# Patient Record
Sex: MALE | Race: WHITE | NOT HISPANIC OR LATINO | Employment: OTHER | URBAN - NONMETROPOLITAN AREA
[De-identification: names, ages, dates, MRNs, and addresses within clinical notes are randomized per-mention and may not be internally consistent; named-entity substitution may affect disease eponyms.]

---

## 2021-09-01 ENCOUNTER — OFFICE VISIT (OUTPATIENT)
Dept: FAMILY MEDICINE CLINIC | Age: 31
End: 2021-09-01

## 2021-09-01 VITALS
WEIGHT: 125.3 LBS | HEIGHT: 61 IN | SYSTOLIC BLOOD PRESSURE: 115 MMHG | DIASTOLIC BLOOD PRESSURE: 59 MMHG | BODY MASS INDEX: 23.66 KG/M2 | TEMPERATURE: 97.7 F | HEART RATE: 98 BPM

## 2021-09-01 DIAGNOSIS — J30.2 SEASONAL ALLERGIES: ICD-10-CM

## 2021-09-01 DIAGNOSIS — T78.40XD ALLERGY, SUBSEQUENT ENCOUNTER: Primary | ICD-10-CM

## 2021-09-01 PROCEDURE — 99203 OFFICE O/P NEW LOW 30 MIN: CPT | Performed by: NURSE PRACTITIONER

## 2021-09-01 RX ORDER — CETIRIZINE HYDROCHLORIDE 10 MG/1
10 TABLET ORAL DAILY
Qty: 30 TABLET | Refills: 11 | Status: SHIPPED | OUTPATIENT
Start: 2021-09-01 | End: 2021-12-01

## 2021-09-01 RX ORDER — CETIRIZINE HYDROCHLORIDE 10 MG/1
10 TABLET ORAL DAILY
COMMUNITY
End: 2021-09-01 | Stop reason: SDUPTHER

## 2021-09-01 RX ORDER — FLUTICASONE PROPIONATE 50 MCG
2 SPRAY, SUSPENSION (ML) NASAL DAILY
Qty: 1 G | Refills: 3 | Status: SHIPPED | OUTPATIENT
Start: 2021-09-01 | End: 2021-09-01

## 2021-09-01 RX ORDER — DIPHENOXYLATE HYDROCHLORIDE AND ATROPINE SULFATE 2.5; .025 MG/1; MG/1
1 TABLET ORAL DAILY
Qty: 30 TABLET | Refills: 11 | Status: SHIPPED | OUTPATIENT
Start: 2021-09-01 | End: 2021-12-01

## 2021-09-01 RX ORDER — TRAZODONE HYDROCHLORIDE 50 MG/1
50 TABLET ORAL NIGHTLY
COMMUNITY
End: 2021-12-01

## 2021-09-01 RX ORDER — CETIRIZINE HYDROCHLORIDE 10 MG/1
10 TABLET ORAL DAILY
Qty: 30 TABLET | Refills: 12 | Status: SHIPPED | OUTPATIENT
Start: 2021-09-01 | End: 2021-09-01

## 2021-09-01 RX ORDER — MONTELUKAST SODIUM 10 MG/1
10 TABLET ORAL NIGHTLY
Qty: 30 TABLET | Refills: 11 | Status: SHIPPED | OUTPATIENT
Start: 2021-09-01 | End: 2021-09-01

## 2021-09-01 RX ORDER — FLUTICASONE PROPIONATE 50 MCG
2 SPRAY, SUSPENSION (ML) NASAL DAILY
COMMUNITY
End: 2021-09-01 | Stop reason: SDUPTHER

## 2021-09-01 RX ORDER — MONTELUKAST SODIUM 10 MG/1
10 TABLET ORAL NIGHTLY
Qty: 30 TABLET | Refills: 11 | Status: SHIPPED | OUTPATIENT
Start: 2021-09-01 | End: 2021-12-01

## 2021-09-01 RX ORDER — FLUTICASONE PROPIONATE 50 MCG
2 SPRAY, SUSPENSION (ML) NASAL DAILY
Qty: 1 G | Refills: 3 | Status: SHIPPED | OUTPATIENT
Start: 2021-09-01 | End: 2022-02-24 | Stop reason: SDUPTHER

## 2021-09-01 RX ORDER — HYDROXYZINE PAMOATE 50 MG/1
50 CAPSULE ORAL DAILY
COMMUNITY
End: 2021-12-01

## 2021-09-01 RX ORDER — DIPHENOXYLATE HYDROCHLORIDE AND ATROPINE SULFATE 2.5; .025 MG/1; MG/1
1 TABLET ORAL DAILY
Qty: 30 TABLET | Refills: 12 | Status: SHIPPED | OUTPATIENT
Start: 2021-09-01 | End: 2021-09-01

## 2021-09-01 RX ORDER — MONTELUKAST SODIUM 10 MG/1
10 TABLET ORAL NIGHTLY
COMMUNITY
End: 2021-09-01 | Stop reason: SDUPTHER

## 2021-09-01 RX ORDER — DIPHENOXYLATE HYDROCHLORIDE AND ATROPINE SULFATE 2.5; .025 MG/1; MG/1
TABLET ORAL DAILY
COMMUNITY
End: 2021-09-01 | Stop reason: SDUPTHER

## 2021-09-01 NOTE — PROGRESS NOTES
"Chief Complaint  Establish Care (New Patient ( last PCP Dr. Jesse [facility] )) and Med Refill    Subjective    Patient in today with caregiver wanting to establish care and get refills. Patient has seasonal allergies that are well controlled at this time with current treatment regimen. Denies any complaints at this time.          Rakesh Mitchell presents to [facility]  URI   This is a recurrent problem. The current episode started more than 1 year ago. The problem has been resolved. There has been no fever. Associated symptoms include rhinorrhea. Pertinent negatives include no congestion, coughing, nausea, rash or sore throat. He has tried antihistamine and decongestant for the symptoms. The treatment provided no relief.       Objective   Vital Signs:   /59 (BP Location: Right arm, Patient Position: Sitting, Cuff Size: Adult)   Pulse 98   Temp 97.7 °F (36.5 °C) (Oral)   Ht 154.9 cm (61\")   Wt 56.8 kg (125 lb 4.8 oz)   BMI 23.68 kg/m²     Physical Exam  HENT:      Head: Normocephalic.      Right Ear: There is impacted cerumen.      Left Ear: There is impacted cerumen.      Nose: Nose normal.      Mouth/Throat:      Mouth: Mucous membranes are moist.      Pharynx: Oropharynx is clear.   Eyes:      Conjunctiva/sclera: Conjunctivae normal.      Pupils: Pupils are equal, round, and reactive to light.   Cardiovascular:      Rate and Rhythm: Normal rate and regular rhythm.      Pulses: Normal pulses.      Heart sounds: Normal heart sounds.   Pulmonary:      Effort: Pulmonary effort is normal.      Breath sounds: Normal breath sounds.   Skin:     General: Skin is warm and dry.   Neurological:      Mental Status: He is alert and oriented to person, place, and time. Mental status is at baseline.   Psychiatric:         Mood and Affect: Mood normal.        Result Review :                 Assessment and Plan    Diagnoses and all orders for this visit:    1. Allergy, " subsequent encounter (Primary)  -     Discontinue: multivitamin (MULTIVITAMIN PO); Take 1 tablet by mouth Daily. Chew 1 gummy by mouth every day  Dispense: 30 tablet; Refill: 12  -     Discontinue: cetirizine (ZyrTEC Allergy) 10 MG tablet; Take 1 tablet by mouth Daily.  Dispense: 30 tablet; Refill: 12  -     Discontinue: fluticasone (Flonase) 50 MCG/ACT nasal spray; 2 sprays into the nostril(s) as directed by provider Daily.  Dispense: 1 g; Refill: 3  -     Discontinue: montelukast (SINGULAIR) 10 MG tablet; Take 1 tablet by mouth Every Night.  Dispense: 30 tablet; Refill: 11  -     cetirizine (ZyrTEC Allergy) 10 MG tablet; Take 1 tablet by mouth Daily.  Dispense: 30 tablet; Refill: 11  -     fluticasone (Flonase) 50 MCG/ACT nasal spray; 2 sprays into the nostril(s) as directed by provider Daily.  Dispense: 1 g; Refill: 3  -     montelukast (SINGULAIR) 10 MG tablet; Take 1 tablet by mouth Every Night.  Dispense: 30 tablet; Refill: 11  -     multivitamin (MULTIVITAMIN PO); Take 1 tablet by mouth Daily. Chew 1 gummy by mouth every day  Dispense: 30 tablet; Refill: 11    2. Seasonal allergies  Assessment & Plan:  Controlled with current treatment, refills sent in.        Follow Up   Return in about 3 months (around 12/1/2021) for Annual physical.  Patient was given instructions and counseling regarding his condition or for health maintenance advice. Please see specific information pulled into the AVS if appropriate.

## 2021-11-15 ENCOUNTER — TELEPHONE (OUTPATIENT)
Dept: FAMILY MEDICINE CLINIC | Age: 31
End: 2021-11-15

## 2021-11-15 NOTE — TELEPHONE ENCOUNTER
Caller: MARLO CHUNG    Relationship: Emergency Contact    Best call back number: [phone]    What form or medical record are you requesting: PHYSICAL     Who is requesting this form or medical record from you: [facility]     How would you like to receive the form or medical records (pick-up, mail, fax): [phone]     Timeframe paper needed: ASAP     Additional notes:

## 2021-11-18 ENCOUNTER — TELEPHONE (OUTPATIENT)
Dept: FAMILY MEDICINE CLINIC | Age: 31
End: 2021-11-18

## 2021-11-18 NOTE — TELEPHONE ENCOUNTER
"Attempted to reach \"Georgette Lomax\" phone number disconnected. If calls back please get accurate phone number.  "

## 2021-11-18 NOTE — TELEPHONE ENCOUNTER
Spoke with Georgette, phone number that is correct is [phone]. She states the paper that was filled out was not for a physical but was told it was a physical form based on whomever she spoke with. After looking at the last visit, patient was not seen for a physical and was to return around 12/1 for a physical. Appointment made.

## 2021-12-01 ENCOUNTER — OFFICE VISIT (OUTPATIENT)
Dept: FAMILY MEDICINE CLINIC | Age: 31
End: 2021-12-01

## 2021-12-01 ENCOUNTER — TELEPHONE (OUTPATIENT)
Dept: FAMILY MEDICINE CLINIC | Age: 31
End: 2021-12-01

## 2021-12-01 VITALS
HEIGHT: 61 IN | WEIGHT: 128 LBS | SYSTOLIC BLOOD PRESSURE: 119 MMHG | HEART RATE: 77 BPM | TEMPERATURE: 98.6 F | BODY MASS INDEX: 24.17 KG/M2 | DIASTOLIC BLOOD PRESSURE: 73 MMHG

## 2021-12-01 DIAGNOSIS — Z00.00 WELLNESS EXAMINATION: Primary | ICD-10-CM

## 2021-12-01 DIAGNOSIS — J30.2 SEASONAL ALLERGIES: ICD-10-CM

## 2021-12-01 LAB
BILIRUB BLD-MCNC: NEGATIVE MG/DL
CLARITY, POC: CLEAR
COLOR UR: YELLOW
EXPIRATION DATE: NORMAL
GLUCOSE UR STRIP-MCNC: NEGATIVE MG/DL
KETONES UR QL: NEGATIVE
LEUKOCYTE EST, POC: NEGATIVE
Lab: NORMAL
NITRITE UR-MCNC: NEGATIVE MG/ML
PH UR: 6.5 [PH] (ref 5–8)
PROT UR STRIP-MCNC: NEGATIVE MG/DL
RBC # UR STRIP: NEGATIVE /UL
SP GR UR: 1.02 (ref 1–1.03)
UROBILINOGEN UR QL: NORMAL

## 2021-12-01 PROCEDURE — 3008F BODY MASS INDEX DOCD: CPT | Performed by: NURSE PRACTITIONER

## 2021-12-01 PROCEDURE — 99395 PREV VISIT EST AGE 18-39: CPT | Performed by: NURSE PRACTITIONER

## 2021-12-01 PROCEDURE — 2014F MENTAL STATUS ASSESS: CPT | Performed by: NURSE PRACTITIONER

## 2021-12-01 RX ORDER — CETIRIZINE HYDROCHLORIDE 5 MG/1
10 TABLET ORAL DAILY
Qty: 300 ML | Refills: 3 | Status: SHIPPED | OUTPATIENT
Start: 2021-12-01 | End: 2021-12-01

## 2021-12-01 RX ORDER — QUETIAPINE FUMARATE 50 MG/1
TABLET, FILM COATED ORAL
COMMUNITY
Start: 2021-10-25 | End: 2021-12-01

## 2021-12-01 RX ORDER — CALCIUM CITRATE/VITAMIN D3 200MG-6.25
TABLET ORAL
COMMUNITY
Start: 2021-10-30 | End: 2022-03-18 | Stop reason: SDUPTHER

## 2021-12-01 RX ORDER — MONTELUKAST SODIUM 5 MG/1
10 TABLET, CHEWABLE ORAL NIGHTLY
Qty: 180 TABLET | Refills: 3 | Status: SHIPPED | OUTPATIENT
Start: 2021-12-01 | End: 2021-12-01

## 2021-12-01 RX ORDER — DIVALPROEX SODIUM 500 MG/1
500 TABLET, DELAYED RELEASE ORAL 2 TIMES DAILY
COMMUNITY
Start: 2021-09-30 | End: 2021-12-01

## 2021-12-01 RX ORDER — HYDROXYZINE 50 MG/1
50 TABLET, FILM COATED ORAL DAILY PRN
COMMUNITY
Start: 2021-10-05 | End: 2021-12-01

## 2021-12-01 RX ORDER — CLONAZEPAM 0.5 MG/1
0.5 TABLET ORAL DAILY
COMMUNITY
Start: 2021-10-25 | End: 2021-12-01

## 2021-12-01 RX ORDER — MONTELUKAST SODIUM 5 MG/1
10 TABLET, CHEWABLE ORAL NIGHTLY
Qty: 180 TABLET | Refills: 3 | Status: SHIPPED | OUTPATIENT
Start: 2021-12-01 | End: 2022-03-18 | Stop reason: SDUPTHER

## 2021-12-01 RX ORDER — CETIRIZINE HYDROCHLORIDE 5 MG/1
10 TABLET ORAL DAILY
Qty: 300 ML | Refills: 3 | Status: SHIPPED | OUTPATIENT
Start: 2021-12-01 | End: 2022-03-18 | Stop reason: SDUPTHER

## 2021-12-01 RX ORDER — QUETIAPINE FUMARATE 25 MG/1
TABLET, FILM COATED ORAL
COMMUNITY
Start: 2021-11-24 | End: 2021-12-01 | Stop reason: ALTCHOICE

## 2021-12-01 NOTE — TELEPHONE ENCOUNTER
Sofia called stating that the Trazodone oral solution is not available.  She said it can not be ordered.  It would have to be compounded and sent to a pharmacy that compounds medications.

## 2021-12-01 NOTE — PROGRESS NOTES
Chief Complaint  Annual Exam    Subjective    Patient is a 31 year old male being seen today for physical. Caregiver reports patient was recently seen for fever and has had some congestion and dry eyes but no other complaints at this time. Patient has come off most medications and is doing well. Patient is unable to swallow pills and caregiver is asking for medications to be changed to liquid form.           Rakesh Mitchell presents to [facility]  Fever   This is a new problem. The current episode started in the past 7 days. The problem has been resolved. The maximum temperature noted was 101 to 101.9 F. The temperature was taken using a tympanic thermometer. Associated symptoms include coughing. Pertinent negatives include no abdominal pain, chest pain, diarrhea, ear pain or sore throat. He has tried acetaminophen for the symptoms. The treatment provided significant relief.           No past medical history on file.    No Known Allergies     No past surgical history on file.    Social History     Socioeconomic History   • Marital status: Single   Tobacco Use   • Smoking status: Never Smoker   • Smokeless tobacco: Never Used   Vaping Use   • Vaping Use: Never used   Substance and Sexual Activity   • Alcohol use: Never   • Drug use: Never       History reviewed. No pertinent family history.     Last Completed Pap Smear     This patient has no relevant Health Maintenance data.          Last Completed Mammogram     This patient has no relevant Health Maintenance data.          Last Completed Colonoscopy     This patient has no relevant Health Maintenance data.            Current Outpatient Medications:   •  Acetaminophen (TYLENOL 8 HOUR PO), Take  by mouth As Needed., Disp: , Rfl:   •  fluticasone (Flonase) 50 MCG/ACT nasal spray, 2 sprays into the nostril(s) as directed by provider Daily., Disp: 1 g, Rfl: 3  •  Multiple Vitamins-Minerals (Multivitamin Gummies Mens) chewable tablet, CHEW  AND SWALLOW 1 GUMMY BY MOUTH EVERY DAY, Disp: , Rfl:   •  Acetamin Chew Tab & Susp 160 & 160 MG &MG/5ML therapy, Take 160 mg by mouth Every 4 (Four) Hours As Needed (Fever, mild pain)., Disp: 1 each, Rfl: 3  •  Cetirizine HCl (zyrTEC) 5 MG/5ML solution solution, Take 10 mL by mouth Daily., Disp: 300 mL, Rfl: 3  •  diphenhydrAMINE (BENYLIN) 12.5 MG/5ML syrup, Take 5 mL by mouth 4 (Four) Times a Day As Needed for Itching or Allergies., Disp: 473 mL, Rfl: 3  •  montelukast (SINGULAIR) 5 MG chewable tablet, Chew 2 tablets Every Night., Disp: 180 tablet, Rfl: 3  •  TRAZODONE 100MG/5ML LIQUID, Take 2.5 mL by mouth Every Night., Disp: 150 mL, Rfl: 1    Medications Discontinued During This Encounter   Medication Reason   • QUEtiapine (SEROquel) 25 MG tablet Discontinued by another clinician   • QUEtiapine (SEROquel) 50 MG tablet    • clonazePAM (KlonoPIN) 0.5 MG tablet    • multivitamin (MULTIVITAMIN PO)    • cetirizine (ZyrTEC Allergy) 10 MG tablet    • montelukast (SINGULAIR) 10 MG tablet    • hydrOXYzine pamoate (VISTARIL) 50 MG capsule    • hydrOXYzine (ATARAX) 50 MG tablet    • traZODone (DESYREL) 50 MG tablet    • divalproex (DEPAKOTE) 500 MG DR tablet    • Cetirizine HCl (zyrTEC) 5 MG/5ML solution solution    • montelukast (SINGULAIR) 5 MG chewable tablet    • TRAZODONE 100MG/5ML LIQUID    • Acetamin Chew Tab & Susp 160 & 160 MG &MG/5ML therapy    • diphenhydrAMINE (BENYLIN) 12.5 MG/5ML syrup        Immunization History   Administered Date(s) Administered   • COVID-19 (PFIZER) 02/22/2021, 03/22/2021   • Flu Vaccine Intradermal Quad 18-64YR 10/28/2009, 11/09/2011   • Flu Vaccine Quad PF >36MO 11/19/2020, 10/20/2021   • Flu Vaccine Split Quad 01/20/2016, 11/19/2020   • Hep B, Adolescent or Pediatric 07/17/2002, 08/29/2002, 03/04/2003   • Hepatitis A 03/27/2019   • MMR 07/17/2002   • Td 03/04/2003   • Tdap 11/15/2011       Review of Systems   Constitutional: Positive for fever. Negative for appetite change and fatigue.  "  HENT: Positive for sinus pressure and sneezing. Negative for ear pain and sore throat.    Eyes: Negative.    Respiratory: Positive for cough. Negative for shortness of breath.    Cardiovascular: Negative.  Negative for chest pain.   Gastrointestinal: Negative.  Negative for abdominal pain, constipation and diarrhea.   Endocrine: Negative.    Genitourinary: Positive for urinary incontinence.   Musculoskeletal: Negative.    Skin: Negative.    Allergic/Immunologic: Positive for environmental allergies.   Neurological: Negative.    Hematological: Negative.    Psychiatric/Behavioral: Negative.  Negative for sleep disturbance.        Objective     Vitals:    12/01/21 0849   BP: 119/73   BP Location: Right arm   Patient Position: Sitting   Pulse: 77   Temp: 98.6 °F (37 °C)   TempSrc: Oral   Weight: 58.1 kg (128 lb)   Height: 154.9 cm (61\")     Body mass index is 24.19 kg/m².         Physical Exam  HENT:      Head: Normocephalic.      Right Ear: There is impacted cerumen.      Left Ear: Tympanic membrane, ear canal and external ear normal.      Nose: Rhinorrhea present.      Mouth/Throat:      Mouth: Mucous membranes are moist.      Pharynx: Oropharynx is clear.   Eyes:      General: Allergic shiner present.      Extraocular Movements: Extraocular movements intact.      Conjunctiva/sclera: Conjunctivae normal.      Pupils: Pupils are equal, round, and reactive to light.   Cardiovascular:      Rate and Rhythm: Normal rate and regular rhythm.      Pulses: Normal pulses.      Heart sounds: Normal heart sounds.   Pulmonary:      Effort: Pulmonary effort is normal.      Breath sounds: Normal breath sounds.   Abdominal:      General: Abdomen is flat. Bowel sounds are normal.      Palpations: Abdomen is soft.   Musculoskeletal:         General: Normal range of motion.      Cervical back: Normal range of motion.   Skin:     General: Skin is warm and dry.      Capillary Refill: Capillary refill takes less than 2 seconds. "   Neurological:      Mental Status: He is alert and oriented to person, place, and time.      Cranial Nerves: Cranial nerves are intact.      Sensory: Sensation is intact.      Coordination: Coordination is intact.      Gait: Gait is intact.   Psychiatric:         Attention and Perception: Attention normal.         Mood and Affect: Mood normal.         Speech: Speech normal.         Behavior: Behavior is cooperative.             Result Review :                           Assessment and Plan        Diagnoses and all orders for this visit:    1. Wellness examination (Primary)  -     Comprehensive Metabolic Panel  -     CBC & Differential  -     TSH  -     Lipid Panel  -     POCT urinalysis dipstick, automated    2. Seasonal allergies  -     Discontinue: Cetirizine HCl (zyrTEC) 5 MG/5ML solution solution; Take 10 mL by mouth Daily.  Dispense: 300 mL; Refill: 3  -     Discontinue: montelukast (SINGULAIR) 5 MG chewable tablet; Chew 2 tablets Every Night.  Dispense: 180 tablet; Refill: 3  -     Discontinue: TRAZODONE 100MG/5ML LIQUID; Take 2.5 mL by mouth Every Night.  Dispense: 150 mL; Refill: 1  -     Discontinue: Acetamin Chew Tab & Susp 160 & 160 MG &MG/5ML therapy; Take 160 mg by mouth Every 4 (Four) Hours As Needed (Fever, mild pain).  Dispense: 1 each; Refill: 3  -     Acetamin Chew Tab & Susp 160 & 160 MG &MG/5ML therapy; Take 160 mg by mouth Every 4 (Four) Hours As Needed (Fever, mild pain).  Dispense: 1 each; Refill: 3  -     Cetirizine HCl (zyrTEC) 5 MG/5ML solution solution; Take 10 mL by mouth Daily.  Dispense: 300 mL; Refill: 3  -     montelukast (SINGULAIR) 5 MG chewable tablet; Chew 2 tablets Every Night.  Dispense: 180 tablet; Refill: 3  -     TRAZODONE 100MG/5ML LIQUID; Take 2.5 mL by mouth Every Night.  Dispense: 150 mL; Refill: 1  -     Discontinue: diphenhydrAMINE (BENYLIN) 12.5 MG/5ML syrup; Take 5 mL by mouth 4 (Four) Times a Day As Needed for Itching or Allergies.  Dispense: 473 mL; Refill: 3  -      diphenhydrAMINE (BENYLIN) 12.5 MG/5ML syrup; Take 5 mL by mouth 4 (Four) Times a Day As Needed for Itching or Allergies.  Dispense: 473 mL; Refill: 3                Follow Up     Return in about 6 months (around 6/1/2022), or if symptoms worsen or fail to improve.    Patient was given instructions and counseling regarding his condition or for health maintenance advice. Please see specific information pulled into the AVS if appropriate.     Rakesh Mitchell  reports that he has never smoked. He has never used smokeless tobacco.. I have educated him on the risk of diseases from using tobacco products such as cancer, COPD and heart disease.

## 2021-12-06 ENCOUNTER — TELEPHONE (OUTPATIENT)
Dept: FAMILY MEDICINE CLINIC | Age: 31
End: 2021-12-06

## 2021-12-06 NOTE — TELEPHONE ENCOUNTER
Caregiver called stating that she cant get the liquid Loratadine because its too soon since he got the pills.  She is asking for a note that he can wait to take it until he finishes the pills.  She needs it dated on the date he was in the office

## 2021-12-08 NOTE — TELEPHONE ENCOUNTER
pts caregiver called back and stated there was an error in the letter that was recently created for them.     The letter needs to state Cetirizine and not Loratadine. Georgette stated pt hasn't taken Loratadine in years.     New letter created- clr

## 2021-12-22 ENCOUNTER — LAB (OUTPATIENT)
Dept: LAB | Facility: HOSPITAL | Age: 31
End: 2021-12-22

## 2021-12-22 LAB
ALBUMIN SERPL-MCNC: 4.8 G/DL (ref 3.5–5.2)
ALBUMIN/GLOB SERPL: 1.8 G/DL
ALP SERPL-CCNC: 99 U/L (ref 39–117)
ALT SERPL W P-5'-P-CCNC: 12 U/L (ref 1–41)
ANION GAP SERPL CALCULATED.3IONS-SCNC: 8.9 MMOL/L (ref 5–15)
AST SERPL-CCNC: 13 U/L (ref 1–40)
BASOPHILS # BLD AUTO: 0.02 10*3/MM3 (ref 0–0.2)
BASOPHILS NFR BLD AUTO: 0.6 % (ref 0–1.5)
BILIRUB SERPL-MCNC: 0.3 MG/DL (ref 0–1.2)
BUN SERPL-MCNC: 13 MG/DL (ref 6–20)
BUN/CREAT SERPL: 14.1 (ref 7–25)
CALCIUM SPEC-SCNC: 9.6 MG/DL (ref 8.6–10.5)
CHLORIDE SERPL-SCNC: 102 MMOL/L (ref 98–107)
CHOLEST SERPL-MCNC: 181 MG/DL (ref 0–200)
CO2 SERPL-SCNC: 29.1 MMOL/L (ref 22–29)
CREAT SERPL-MCNC: 0.92 MG/DL (ref 0.76–1.27)
DEPRECATED RDW RBC AUTO: 41.4 FL (ref 37–54)
EOSINOPHIL # BLD AUTO: 0.03 10*3/MM3 (ref 0–0.4)
EOSINOPHIL NFR BLD AUTO: 0.9 % (ref 0.3–6.2)
ERYTHROCYTE [DISTWIDTH] IN BLOOD BY AUTOMATED COUNT: 12.7 % (ref 12.3–15.4)
GFR SERPL CREATININE-BSD FRML MDRD: 97 ML/MIN/1.73
GLOBULIN UR ELPH-MCNC: 2.7 GM/DL
GLUCOSE SERPL-MCNC: 86 MG/DL (ref 65–99)
HCT VFR BLD AUTO: 47.4 % (ref 37.5–51)
HDLC SERPL-MCNC: 33 MG/DL (ref 40–60)
HGB BLD-MCNC: 16 G/DL (ref 13–17.7)
IMM GRANULOCYTES # BLD AUTO: 0.01 10*3/MM3 (ref 0–0.05)
IMM GRANULOCYTES NFR BLD AUTO: 0.3 % (ref 0–0.5)
LDLC SERPL CALC-MCNC: 119 MG/DL (ref 0–100)
LDLC/HDLC SERPL: 3.5 {RATIO}
LYMPHOCYTES # BLD AUTO: 1.34 10*3/MM3 (ref 0.7–3.1)
LYMPHOCYTES NFR BLD AUTO: 39.2 % (ref 19.6–45.3)
MCH RBC QN AUTO: 30.2 PG (ref 26.6–33)
MCHC RBC AUTO-ENTMCNC: 33.8 G/DL (ref 31.5–35.7)
MCV RBC AUTO: 89.4 FL (ref 79–97)
MONOCYTES # BLD AUTO: 0.43 10*3/MM3 (ref 0.1–0.9)
MONOCYTES NFR BLD AUTO: 12.6 % (ref 5–12)
NEUTROPHILS NFR BLD AUTO: 1.59 10*3/MM3 (ref 1.7–7)
NEUTROPHILS NFR BLD AUTO: 46.4 % (ref 42.7–76)
PLATELET # BLD AUTO: 289 10*3/MM3 (ref 140–450)
PMV BLD AUTO: 9 FL (ref 6–12)
POTASSIUM SERPL-SCNC: 4.1 MMOL/L (ref 3.5–5.2)
PROT SERPL-MCNC: 7.5 G/DL (ref 6–8.5)
RBC # BLD AUTO: 5.3 10*6/MM3 (ref 4.14–5.8)
SODIUM SERPL-SCNC: 140 MMOL/L (ref 136–145)
TRIGL SERPL-MCNC: 162 MG/DL (ref 0–150)
TSH SERPL DL<=0.05 MIU/L-ACNC: 1.41 UIU/ML (ref 0.27–4.2)
VLDLC SERPL-MCNC: 29 MG/DL (ref 5–40)
WBC NRBC COR # BLD: 3.42 10*3/MM3 (ref 3.4–10.8)

## 2021-12-22 PROCEDURE — 85025 COMPLETE CBC W/AUTO DIFF WBC: CPT | Performed by: NURSE PRACTITIONER

## 2021-12-22 PROCEDURE — 80061 LIPID PANEL: CPT | Performed by: NURSE PRACTITIONER

## 2021-12-22 PROCEDURE — 80053 COMPREHEN METABOLIC PANEL: CPT | Performed by: NURSE PRACTITIONER

## 2021-12-22 PROCEDURE — 84443 ASSAY THYROID STIM HORMONE: CPT | Performed by: NURSE PRACTITIONER

## 2021-12-28 NOTE — PROGRESS NOTES
Thyroid stimulation hormone within normal limits. WBC normal, no signs of infection. Triglycerides elevated. Recommend increasing physical activity to at least 150 minutes per week and a low fat, high protein diet to improve his lipid numbers. Please mail caregiver a copy of lab results.

## 2022-01-05 ENCOUNTER — TELEPHONE (OUTPATIENT)
Dept: FAMILY MEDICINE CLINIC | Age: 32
End: 2022-01-05

## 2022-01-05 NOTE — TELEPHONE ENCOUNTER
Provider: DONOVAN CURRY  Caller: MARLO CHUNG  Relationship to Patient: CAREGIVER  Phone Number: [phone]   Reason for Call: CAREGIVER IS WANTING TO KNOW IF PATIENT NEEDS TO BE ON ANY MEDICATIONS DUE TO THE LAB RESULTS    PLEASE CALL AND ADVISE    HUB UNABLE TO WARM TRANSFER

## 2022-01-10 NOTE — TELEPHONE ENCOUNTER
Patient does not need medications due to recent lab results. Recommendation is to increase physical activity to at least 150 minutes per week and decrease processed food intake, this is first line measures for increased triglycerides. We will repeat labs in one year to monitor for worsening.

## 2022-02-24 DIAGNOSIS — T78.40XD ALLERGY, SUBSEQUENT ENCOUNTER: ICD-10-CM

## 2022-02-24 RX ORDER — FLUTICASONE PROPIONATE 50 MCG
2 SPRAY, SUSPENSION (ML) NASAL DAILY
Qty: 15.8 ML | Refills: 5 | Status: SHIPPED | OUTPATIENT
Start: 2022-02-24 | End: 2022-03-18 | Stop reason: SDUPTHER

## 2022-03-02 ENCOUNTER — PRIOR AUTHORIZATION (OUTPATIENT)
Dept: FAMILY MEDICINE CLINIC | Age: 32
End: 2022-03-02

## 2022-03-03 PROBLEM — R01.1 MURMUR, HEART: Status: ACTIVE | Noted: 2019-03-27

## 2022-03-03 PROBLEM — H61.23 CERUMINOSIS, BILATERAL: Status: ACTIVE | Noted: 2019-03-27

## 2022-03-03 PROBLEM — R10.13 DYSPEPSIA: Status: ACTIVE | Noted: 2019-04-10

## 2022-03-18 DIAGNOSIS — T78.40XD ALLERGY, SUBSEQUENT ENCOUNTER: ICD-10-CM

## 2022-03-18 DIAGNOSIS — J30.2 SEASONAL ALLERGIES: ICD-10-CM

## 2022-03-18 RX ORDER — MONTELUKAST SODIUM 5 MG/1
10 TABLET, CHEWABLE ORAL NIGHTLY
Qty: 180 TABLET | Refills: 3 | Status: SHIPPED | OUTPATIENT
Start: 2022-03-18 | End: 2022-03-21 | Stop reason: SDUPTHER

## 2022-03-18 RX ORDER — FLUTICASONE PROPIONATE 50 MCG
2 SPRAY, SUSPENSION (ML) NASAL DAILY
Qty: 15.8 ML | Refills: 5 | Status: SHIPPED | OUTPATIENT
Start: 2022-03-18 | End: 2022-03-21 | Stop reason: SDUPTHER

## 2022-03-18 RX ORDER — CALCIUM CITRATE/VITAMIN D3 200MG-6.25
1 TABLET ORAL DAILY
Qty: 90 TABLET | Refills: 3 | Status: SHIPPED | OUTPATIENT
Start: 2022-03-18 | End: 2022-08-25 | Stop reason: SDUPTHER

## 2022-03-18 RX ORDER — SENNOSIDES 8.6 MG
650 CAPSULE ORAL EVERY 8 HOURS PRN
Qty: 30 TABLET | Refills: 3 | Status: SHIPPED | OUTPATIENT
Start: 2022-03-18 | End: 2022-06-06

## 2022-03-18 RX ORDER — CETIRIZINE HYDROCHLORIDE 5 MG/1
10 TABLET ORAL DAILY
Qty: 300 ML | Refills: 3 | Status: SHIPPED | OUTPATIENT
Start: 2022-03-18 | End: 2022-04-18

## 2022-03-18 NOTE — TELEPHONE ENCOUNTER
Caller: MARLO CHUNG    Relationship: Emergency Contact    Best call back number: [phone]    Requested Prescriptions:   Requested Prescriptions     Pending Prescriptions Disp Refills   • montelukast (SINGULAIR) 5 MG chewable tablet 180 tablet 3     Sig: Chew 2 tablets Every Night.   • fluticasone (Flonase) 50 MCG/ACT nasal spray 15.8 mL 5     Si sprays into the nostril(s) as directed by provider Daily.   • diphenhydrAMINE (BENYLIN) 12.5 MG/5ML syrup 473 mL 3     Sig: Take 5 mL by mouth 4 (Four) Times a Day As Needed for Itching or Allergies.   • TRAZODONE 100MG/5ML LIQUID 150 mL 1     Sig: Take 2.5 mL by mouth Every Night.   • Cetirizine HCl (zyrTEC) 5 MG/5ML solution solution 300 mL 3     Sig: Take 10 mL by mouth Daily.   • Acetamin Chew Tab & Susp 160 & 160 MG &MG/5ML therapy 1 each 3     Sig: Take 160 mg by mouth Every 4 (Four) Hours As Needed (Fever, mild pain).   • Multiple Vitamins-Minerals (Multivitamin Gummies Mens) chewable tablet     • acetaminophen (Tylenol 8 Hour) 650 MG 8 hr tablet       Sig: Take  by mouth As Needed.        Pharmacy where request should be sent: [id number] - [place] [address] - [phone] [facility] [phone] FX       Does the patient have less than a 3 day supply:  [x] Yes  [] No    Olga Lidia Pendleton Rep   22 12:50 EDT

## 2022-03-21 DIAGNOSIS — J30.2 SEASONAL ALLERGIES: ICD-10-CM

## 2022-03-21 DIAGNOSIS — T78.40XD ALLERGY, SUBSEQUENT ENCOUNTER: ICD-10-CM

## 2022-03-21 RX ORDER — FLUTICASONE PROPIONATE 50 MCG
2 SPRAY, SUSPENSION (ML) NASAL DAILY
Qty: 15.8 ML | Refills: 5 | Status: SHIPPED | OUTPATIENT
Start: 2022-03-21 | End: 2022-08-29 | Stop reason: SDUPTHER

## 2022-03-21 RX ORDER — MONTELUKAST SODIUM 5 MG/1
10 TABLET, CHEWABLE ORAL NIGHTLY
Qty: 180 TABLET | Refills: 3 | Status: SHIPPED | OUTPATIENT
Start: 2022-03-21 | End: 2022-08-25 | Stop reason: SDUPTHER

## 2022-03-21 RX ORDER — MONTELUKAST SODIUM 5 MG/1
10 TABLET, CHEWABLE ORAL NIGHTLY
Qty: 180 TABLET | Refills: 3 | Status: SHIPPED | OUTPATIENT
Start: 2022-03-21 | End: 2022-03-21 | Stop reason: SDUPTHER

## 2022-03-22 ENCOUNTER — TELEPHONE (OUTPATIENT)
Dept: FAMILY MEDICINE CLINIC | Age: 32
End: 2022-03-22

## 2022-03-22 DIAGNOSIS — J30.2 SEASONAL ALLERGIES: ICD-10-CM

## 2022-03-22 NOTE — TELEPHONE ENCOUNTER
The nurse called stating that since the Benadryl is prescribed to be qid, she is asking for a new rx for it just to be taken at pm and make it good for a year.    [phone]

## 2022-03-25 DIAGNOSIS — J30.2 SEASONAL ALLERGIES: ICD-10-CM

## 2022-03-25 NOTE — TELEPHONE ENCOUNTER
Rx Refill Note  Requested Prescriptions     Pending Prescriptions Disp Refills   • diphenhydrAMINE (BENYLIN) 12.5 MG/5ML syrup 237 mL 3     Sig: Take 5 mL by mouth At Night As Needed for Itching, Allergies or Sleep (TAKE ONE TIME DAILY PRN AT NIGHT). TAKE ONE TIME DAILY PRN AT NIGHT      Last office visit with prescribing clinician: 12/1/2021          Is Refill Pharmacy correct? YES  Elizabeth Carpenter LPN  03/25/22, 11:48 EDT       Gustavo AT [facility] NEEDING SCRIPT TO BE MORE DETAILED AND DESCRIPTIVE. NEEDING NEW/UPDATED SCRIPT SENT TO [facility] AS WELL. SPOKE WITH DONOVAN WAS GIVEN VERBAL ORDER TO CHANGE ORDER TO READ AS NURSING SERVICES NEED.    CLR

## 2022-04-18 DIAGNOSIS — J30.2 SEASONAL ALLERGIES: ICD-10-CM

## 2022-04-18 RX ORDER — CETIRIZINE HYDROCHLORIDE 1 MG/ML
SOLUTION ORAL
Qty: 300 ML | Refills: 3 | Status: SHIPPED | OUTPATIENT
Start: 2022-04-18 | End: 2022-07-21 | Stop reason: SDUPTHER

## 2022-05-27 ENCOUNTER — TELEPHONE (OUTPATIENT)
Dept: FAMILY MEDICINE CLINIC | Age: 32
End: 2022-05-27

## 2022-05-27 NOTE — TELEPHONE ENCOUNTER
Caller: MARLO CHUNG    Relationship: Emergency Contact    Best call back number: [phone]    What medication are you requesting: PATIENTS CARE GIVER IS ASKING FOR A PRESCRIPTION FOR OTC CBD  TWICE DAILY MORNING AND NIGHT     What are your current symptoms: ANXIETY    How long have you been experiencing symptoms: CONSTANT    Have you had these symptoms before:    [x] Yes  [] No    Have you been treated for these symptoms before:   [x] Yes  [] No    If a prescription is needed, what is your preferred pharmacy and phone number:   PURCHASED OTC BUT PATIENT NEEDS A PRESCRIPTION FOR RECORDS.

## 2022-05-31 NOTE — TELEPHONE ENCOUNTER
We are unable to write a prescription for that due to not being regulated by the FDA. On call provider recommended contacting the patient's guardian and getting written approval.

## 2022-06-06 ENCOUNTER — OFFICE VISIT (OUTPATIENT)
Dept: FAMILY MEDICINE CLINIC | Age: 32
End: 2022-06-06

## 2022-06-06 VITALS
HEIGHT: 61 IN | WEIGHT: 125 LBS | SYSTOLIC BLOOD PRESSURE: 102 MMHG | HEART RATE: 68 BPM | BODY MASS INDEX: 23.6 KG/M2 | DIASTOLIC BLOOD PRESSURE: 65 MMHG

## 2022-06-06 DIAGNOSIS — F79 MENTAL IMPAIRMENT: ICD-10-CM

## 2022-06-06 DIAGNOSIS — R45.1 AGITATION: Primary | ICD-10-CM

## 2022-06-06 DIAGNOSIS — J30.2 SEASONAL ALLERGIES: ICD-10-CM

## 2022-06-06 PROCEDURE — 99214 OFFICE O/P EST MOD 30 MIN: CPT | Performed by: NURSE PRACTITIONER

## 2022-06-06 RX ORDER — DIPHENHYDRAMINE HYDROCHLORIDE 12.5 MG/5ML
LIQUID ORAL
COMMUNITY
Start: 2022-03-31 | End: 2022-08-01

## 2022-06-06 RX ORDER — HYDROXYZINE HYDROCHLORIDE 25 MG/1
25 TABLET, FILM COATED ORAL AS NEEDED
COMMUNITY
Start: 2022-05-23 | End: 2022-08-29 | Stop reason: ALTCHOICE

## 2022-06-06 NOTE — ASSESSMENT & PLAN NOTE
Okay to try CBD oil supplement. Potential s/e discussed. Discussed that it is not FDA approved.  Will f/u as needed.

## 2022-06-06 NOTE — PROGRESS NOTES
"Chief Complaint  Agitation    Subjective          Rakesh Mitchell presents to [facility]      The patient is accompanied by his care giver and she reports that he has    \"anger issues\", she states she would like to try him on CBD oil. She reports the patient hits himself, and gets upset if she makes him get out of the vehicle or get out of his bedroom. She notes the patient wants to stay in his bedroom all day. She complains the patient will sit in the car for long periods of time because he can not decipher between hot and cold.  Since he is a well[place] of the [facility] it has to be documented that a provider has recommended the supplement.  He is also needing to establish care as previous PCP is only seeing acute patient from this point forward.  Medical, surgical, family social history is reviewed and updated in chart.  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 Pt also suffers from seasonal allergies.      Objective   Vital Signs:   /65 (BP Location: Right arm, Patient Position: Sitting)   Pulse 68   Ht 154.9 cm (61\")   Wt 56.7 kg (125 lb)   BMI 23.62 kg/m²       Physical Exam  Vitals reviewed.   Constitutional:       General: He is not in acute distress.     Appearance: He is well-developed.   HENT:      Head: Normocephalic and atraumatic.   Eyes:      Conjunctiva/sclera: Conjunctivae normal.      Pupils: Pupils are equal, round, and reactive to light.   Cardiovascular:      Rate and Rhythm: Normal rate and regular rhythm.      Heart sounds: Normal heart sounds. No murmur heard.  Pulmonary:      Effort: Pulmonary effort is normal. No respiratory distress.      Breath sounds: Normal breath sounds.   Skin:     General: Skin is warm and dry.   Neurological:      Mental Status: He is alert and oriented to person, place, and time. Mental status is at baseline.   Psychiatric:         Mood and Affect: Affect normal.         " Judgment: Judgment normal.      Comments: Flight of ideas              Result Review :                Assessment and Plan    Diagnoses and all orders for this visit:    1. Agitation (Primary)  Assessment & Plan:  Okay to try CBD oil supplement. Potential s/e discussed. Discussed that it is not FDA approved.  Will f/u as needed.       2. Mental impairment  Assessment & Plan:  Continue going to active day and follow-up as needed.        3. Seasonal allergies  Assessment & Plan:  Continue Flonase, Singulair and Zyrtec.  Stable.        Follow Up    Return if symptoms worsen or fail to improve.  Patient was given instructions and counseling regarding his condition or for health maintenance advice. Please see specific information pulled into the AVS if appropriate.     Caregiver to notify office with any acute concerns or issues. They both verbalize understanding, agrees with plan of care and has no further questions upon discharge.  Please note that portions of this note were completed with a voice recognition program.    JP Louise    Transcribed from ambient dictation for JP Louise by Mariel Smith.  06/06/22   09:21 EDT    Patient verbalized consent to the visit recording.

## 2022-06-14 ENCOUNTER — TELEPHONE (OUTPATIENT)
Dept: FAMILY MEDICINE CLINIC | Age: 32
End: 2022-06-14

## 2022-06-14 DIAGNOSIS — J30.2 SEASONAL ALLERGIES: ICD-10-CM

## 2022-06-14 RX ORDER — CETIRIZINE HYDROCHLORIDE 1 MG/ML
SOLUTION ORAL
Qty: 300 ML | Refills: 3 | Status: CANCELLED | OUTPATIENT
Start: 2022-06-14

## 2022-06-14 NOTE — TELEPHONE ENCOUNTER
Caller: MARLO CHUNG    Relationship: Emergency Contact    Best call back number: [phone]    Requested Prescriptions:   Requested Prescriptions     Pending Prescriptions Disp Refills   • Cetirizine HCl (zyrTEC) 1 MG/ML syrup 300 mL 3        Pharmacy where request should be sent: [id number] - [place], [place] - [address]  [address] [facility] - [phone] [facility] [phone] FX     Additional details provided by patient: PATIENTS CARE GIVER HAS LET US KNOW THAT THE PHARMACY DOES NOT HAVE THE ZYRTEC AVAILABLE AT THIS TIME. SHE STATES THAT THE PHARMACY DOES HAVE ZYRTEC 10 ML ONCE A DAY OVER THE COUNTER. SHE STATES THE PHARMACY SAYS THE ESCRIPT HAS TO SAY OVER OTC.    PATIENT IS [place] AND THE MEDICATION HAS TO BE REFILLED. CARE GIVER WOULD LIKE A CALL BACK TO MAKE SURE MEDICATION MATCH WHAT SHE HAS TO REPORT BACK TO THE STATE    Does the patient have less than a 3 day supply:  [x] Yes  [] No    Olga Lidia Magallon Rep   06/14/22 11:17 EDT

## 2022-07-21 DIAGNOSIS — J30.2 SEASONAL ALLERGIES: ICD-10-CM

## 2022-07-21 RX ORDER — CETIRIZINE HYDROCHLORIDE 1 MG/ML
10 SOLUTION ORAL DAILY
Qty: 300 ML | Refills: 11 | Status: SHIPPED | OUTPATIENT
Start: 2022-07-21 | End: 2022-08-25 | Stop reason: SDUPTHER

## 2022-08-01 RX ORDER — DIPHENHYDRAMINE HYDROCHLORIDE 12.5 MG/5ML
LIQUID ORAL
Qty: 473 ML | Refills: 0 | Status: SHIPPED | OUTPATIENT
Start: 2022-08-01 | End: 2022-08-29 | Stop reason: SDUPTHER

## 2022-08-02 ENCOUNTER — TELEPHONE (OUTPATIENT)
Dept: FAMILY MEDICINE CLINIC | Age: 32
End: 2022-08-02

## 2022-08-02 NOTE — TELEPHONE ENCOUNTER
We received a prescription gap coverage form that needs to be completed for [facility]. Left in your bin

## 2022-08-25 ENCOUNTER — TELEPHONE (OUTPATIENT)
Dept: FAMILY MEDICINE CLINIC | Age: 32
End: 2022-08-25

## 2022-08-25 DIAGNOSIS — J30.2 SEASONAL ALLERGIES: ICD-10-CM

## 2022-08-25 DIAGNOSIS — T78.40XD ALLERGY, SUBSEQUENT ENCOUNTER: ICD-10-CM

## 2022-08-25 NOTE — TELEPHONE ENCOUNTER
Caller: EVA CHUNGE    Relationship: Emergency Contact    Best call back number: [phone]    Requested Prescriptions:   Requested Prescriptions     Pending Prescriptions Disp Refills   • Cetirizine HCl (zyrTEC) 1 MG/ML syrup 300 mL 11     Sig: Take 10 mL by mouth Daily.   • Multiple Vitamins-Minerals (Multivitamin Gummies Mens) chewable tablet 90 tablet 3     Sig: Chew 1 each Daily.   • TRAZODONE 100MG/5ML LIQUID 150 mL 1     Sig: Take 2.5 mL by mouth Every Night.   • montelukast (SINGULAIR) 5 MG chewable tablet 180 tablet 3     Sig: Chew 2 tablets Every Night.        Pharmacy where request should be sent: [email or web address] #[id number] - [address] [phone] [facility] [phone] FX     PATIENT ALSO MENTIONED A BENEDRYL MEDICATION BUT I DID NOT SEE IT IN HIS MEDICATION LIST.       Does the patient have less than a 3 day supply:  [x] Yes  [] No    Olga Lidia Navas Rep   08/25/22 11:44 EDT

## 2022-08-29 RX ORDER — FLUTICASONE PROPIONATE 50 MCG
2 SPRAY, SUSPENSION (ML) NASAL DAILY
Qty: 15.8 ML | Refills: 11 | Status: SHIPPED | OUTPATIENT
Start: 2022-08-29 | End: 2022-11-08 | Stop reason: SDUPTHER

## 2022-08-29 RX ORDER — CETIRIZINE HYDROCHLORIDE 1 MG/ML
10 SOLUTION ORAL DAILY
Qty: 300 ML | Refills: 11 | Status: SHIPPED | OUTPATIENT
Start: 2022-08-29 | End: 2022-11-08 | Stop reason: SDUPTHER

## 2022-08-29 RX ORDER — CALCIUM CITRATE/VITAMIN D3 200MG-6.25
1 TABLET ORAL DAILY
Qty: 90 TABLET | Refills: 3 | Status: SHIPPED | OUTPATIENT
Start: 2022-08-29 | End: 2022-09-06

## 2022-08-29 RX ORDER — MONTELUKAST SODIUM 5 MG/1
10 TABLET, CHEWABLE ORAL NIGHTLY
Qty: 180 TABLET | Refills: 3 | Status: SHIPPED | OUTPATIENT
Start: 2022-08-29 | End: 2022-11-08 | Stop reason: SDUPTHER

## 2022-08-29 NOTE — TELEPHONE ENCOUNTER
Refills have been sent. Pt is on hydroxyzine. Can't send in benadryl as they are in the same class. Thank you, Indira

## 2022-08-29 NOTE — TELEPHONE ENCOUNTER
Pt's care giver states he only took the Atarax that one time for a dental appt.  She just wants a D/C order to D/C the Benadryl and then an rx for Flonase sent in.

## 2022-09-06 RX ORDER — CALCIUM CITRATE/VITAMIN D3 200MG-6.25
TABLET ORAL
Qty: 90 TABLET | Refills: 3 | Status: SHIPPED | OUTPATIENT
Start: 2022-09-06 | End: 2022-11-08 | Stop reason: SDUPTHER

## 2022-11-05 DIAGNOSIS — J30.2 SEASONAL ALLERGIES: ICD-10-CM

## 2022-11-07 RX ORDER — CETIRIZINE HYDROCHLORIDE 1 MG/ML
10 SOLUTION ORAL DAILY
Qty: 300 ML | Refills: 11 | OUTPATIENT
Start: 2022-11-07

## 2022-11-08 ENCOUNTER — TELEPHONE (OUTPATIENT)
Dept: FAMILY MEDICINE CLINIC | Age: 32
End: 2022-11-08

## 2022-11-08 DIAGNOSIS — T78.40XD ALLERGY, SUBSEQUENT ENCOUNTER: ICD-10-CM

## 2022-11-08 DIAGNOSIS — J30.2 SEASONAL ALLERGIES: ICD-10-CM

## 2022-11-08 NOTE — TELEPHONE ENCOUNTER
Caller: MARLO CHUNG    Relationship: Emergency Contact    Best call back number: [phone]    Requested Prescriptions:   Requested Prescriptions     Pending Prescriptions Disp Refills   • Multiple Vitamins-Minerals (Multivitamin Gummies Mens) chewable tablet 90 tablet 3   • Cetirizine HCl (zyrTEC) 1 MG/ML syrup 300 mL 11     Sig: Take 10 mL by mouth Daily.   • montelukast (SINGULAIR) 5 MG chewable tablet 180 tablet 3     Sig: Chew 2 tablets Every Night.   • fluticasone (Flonase) 50 MCG/ACT nasal spray 15.8 mL 11     Si sprays into the nostril(s) as directed by provider Daily.   • TRAZODONE 100MG/5ML LIQUID 150 mL 1     Sig: Take 2.5 mL by mouth Every Night.        Pharmacy where request should be sent: [id number] - [address] [phone] [facility] [phone]      Additional details provided by patient: WOULD LIKE TO HAVE A WRITTEN PRESCRIPTION FOR CBD OIL AS WELL PLEASE      Olga Lidia Lamb Rep   22 12:08 EST

## 2022-11-10 RX ORDER — CALCIUM CITRATE/VITAMIN D3 200MG-6.25
1 TABLET ORAL DAILY
Qty: 90 TABLET | Refills: 3 | Status: SHIPPED | OUTPATIENT
Start: 2022-11-10 | End: 2022-12-12 | Stop reason: SDUPTHER

## 2022-11-10 RX ORDER — FLUTICASONE PROPIONATE 50 MCG
2 SPRAY, SUSPENSION (ML) NASAL DAILY
Qty: 15.8 ML | Refills: 11 | Status: SHIPPED | OUTPATIENT
Start: 2022-11-10 | End: 2022-12-12 | Stop reason: SDUPTHER

## 2022-11-10 RX ORDER — CETIRIZINE HYDROCHLORIDE 1 MG/ML
10 SOLUTION ORAL DAILY
Qty: 300 ML | Refills: 11 | Status: SHIPPED | OUTPATIENT
Start: 2022-11-10 | End: 2022-12-12 | Stop reason: SDUPTHER

## 2022-11-10 RX ORDER — MONTELUKAST SODIUM 5 MG/1
10 TABLET, CHEWABLE ORAL NIGHTLY
Qty: 180 TABLET | Refills: 3 | Status: SHIPPED | OUTPATIENT
Start: 2022-11-10 | End: 2022-12-12 | Stop reason: SDUPTHER

## 2022-11-11 DIAGNOSIS — J30.2 SEASONAL ALLERGIES: ICD-10-CM

## 2022-12-07 ENCOUNTER — CLINICAL SUPPORT (OUTPATIENT)
Dept: FAMILY MEDICINE CLINIC | Age: 32
End: 2022-12-07

## 2022-12-07 DIAGNOSIS — Z23 NEED FOR VACCINATION: Primary | ICD-10-CM

## 2022-12-07 PROCEDURE — 90686 IIV4 VACC NO PRSV 0.5 ML IM: CPT | Performed by: FAMILY MEDICINE

## 2022-12-07 PROCEDURE — 0124A PR ADM SARSCOV2 30MCG/0.3ML BST: CPT | Performed by: FAMILY MEDICINE

## 2022-12-07 PROCEDURE — 91312 COVID-19 (PFIZER) BIVALENT BOOSTER 12+YRS: CPT | Performed by: FAMILY MEDICINE

## 2022-12-07 PROCEDURE — 90471 IMMUNIZATION ADMIN: CPT | Performed by: FAMILY MEDICINE

## 2022-12-12 ENCOUNTER — TELEPHONE (OUTPATIENT)
Dept: FAMILY MEDICINE CLINIC | Age: 32
End: 2022-12-12

## 2022-12-12 ENCOUNTER — OFFICE VISIT (OUTPATIENT)
Dept: FAMILY MEDICINE CLINIC | Age: 32
End: 2022-12-12

## 2022-12-12 ENCOUNTER — LAB (OUTPATIENT)
Dept: LAB | Facility: HOSPITAL | Age: 32
End: 2022-12-12

## 2022-12-12 VITALS
WEIGHT: 123.6 LBS | BODY MASS INDEX: 23.33 KG/M2 | HEIGHT: 61 IN | HEART RATE: 69 BPM | DIASTOLIC BLOOD PRESSURE: 72 MMHG | SYSTOLIC BLOOD PRESSURE: 121 MMHG

## 2022-12-12 DIAGNOSIS — Z00.00 ROUTINE GENERAL MEDICAL EXAMINATION AT A HEALTH CARE FACILITY: Primary | ICD-10-CM

## 2022-12-12 DIAGNOSIS — Z23 ENCOUNTER FOR IMMUNIZATION: ICD-10-CM

## 2022-12-12 DIAGNOSIS — Z00.00 ROUTINE GENERAL MEDICAL EXAMINATION AT A HEALTH CARE FACILITY: ICD-10-CM

## 2022-12-12 DIAGNOSIS — R45.1 AGITATION: ICD-10-CM

## 2022-12-12 DIAGNOSIS — H61.23 IMPACTED CERUMEN OF BOTH EARS: ICD-10-CM

## 2022-12-12 DIAGNOSIS — J30.2 SEASONAL ALLERGIES: ICD-10-CM

## 2022-12-12 LAB
ALBUMIN SERPL-MCNC: 5 G/DL (ref 3.5–5.2)
ALBUMIN/GLOB SERPL: 2 G/DL
ALP SERPL-CCNC: 88 U/L (ref 39–117)
ALT SERPL W P-5'-P-CCNC: 14 U/L (ref 1–41)
ANION GAP SERPL CALCULATED.3IONS-SCNC: 11 MMOL/L (ref 5–15)
AST SERPL-CCNC: 16 U/L (ref 1–40)
BILIRUB SERPL-MCNC: 0.4 MG/DL (ref 0–1.2)
BUN SERPL-MCNC: 9 MG/DL (ref 6–20)
BUN/CREAT SERPL: 11.7 (ref 7–25)
CALCIUM SPEC-SCNC: 9.9 MG/DL (ref 8.6–10.5)
CHLORIDE SERPL-SCNC: 102 MMOL/L (ref 98–107)
CHOLEST SERPL-MCNC: 176 MG/DL (ref 0–200)
CO2 SERPL-SCNC: 27 MMOL/L (ref 22–29)
CREAT SERPL-MCNC: 0.77 MG/DL (ref 0.76–1.27)
EGFRCR SERPLBLD CKD-EPI 2021: 122.7 ML/MIN/1.73
GLOBULIN UR ELPH-MCNC: 2.5 GM/DL
GLUCOSE SERPL-MCNC: 86 MG/DL (ref 65–99)
HCV AB SER DONR QL: NORMAL
HDLC SERPL-MCNC: 37 MG/DL (ref 40–60)
LDLC SERPL CALC-MCNC: 110 MG/DL (ref 0–100)
LDLC/HDLC SERPL: 2.87 {RATIO}
POTASSIUM SERPL-SCNC: 4 MMOL/L (ref 3.5–5.2)
PROT SERPL-MCNC: 7.5 G/DL (ref 6–8.5)
SODIUM SERPL-SCNC: 140 MMOL/L (ref 136–145)
TRIGL SERPL-MCNC: 164 MG/DL (ref 0–150)
VLDLC SERPL-MCNC: 29 MG/DL (ref 5–40)

## 2022-12-12 PROCEDURE — 99395 PREV VISIT EST AGE 18-39: CPT | Performed by: NURSE PRACTITIONER

## 2022-12-12 PROCEDURE — 86803 HEPATITIS C AB TEST: CPT

## 2022-12-12 PROCEDURE — 3008F BODY MASS INDEX DOCD: CPT | Performed by: NURSE PRACTITIONER

## 2022-12-12 PROCEDURE — 80053 COMPREHEN METABOLIC PANEL: CPT

## 2022-12-12 PROCEDURE — 36415 COLL VENOUS BLD VENIPUNCTURE: CPT

## 2022-12-12 PROCEDURE — 90471 IMMUNIZATION ADMIN: CPT | Performed by: NURSE PRACTITIONER

## 2022-12-12 PROCEDURE — 2014F MENTAL STATUS ASSESS: CPT | Performed by: NURSE PRACTITIONER

## 2022-12-12 PROCEDURE — 80061 LIPID PANEL: CPT

## 2022-12-12 PROCEDURE — 90715 TDAP VACCINE 7 YRS/> IM: CPT | Performed by: NURSE PRACTITIONER

## 2022-12-12 RX ORDER — TRAZODONE HYDROCHLORIDE 50 MG/1
50 TABLET ORAL NIGHTLY
Qty: 90 TABLET | Refills: 1 | Status: SHIPPED | OUTPATIENT
Start: 2022-12-12

## 2022-12-12 RX ORDER — FLUTICASONE PROPIONATE 50 MCG
2 SPRAY, SUSPENSION (ML) NASAL DAILY
Qty: 15.8 ML | Refills: 11 | Status: SHIPPED | OUTPATIENT
Start: 2022-12-12

## 2022-12-12 RX ORDER — MONTELUKAST SODIUM 5 MG/1
10 TABLET, CHEWABLE ORAL NIGHTLY
Qty: 180 TABLET | Refills: 3 | Status: SHIPPED | OUTPATIENT
Start: 2022-12-12

## 2022-12-12 RX ORDER — SERTRALINE HYDROCHLORIDE 20 MG/ML
SOLUTION ORAL
COMMUNITY
Start: 2022-11-28 | End: 2022-12-12 | Stop reason: SDUPTHER

## 2022-12-12 RX ORDER — CETIRIZINE HYDROCHLORIDE 1 MG/ML
10 SOLUTION ORAL DAILY
Qty: 900 ML | Refills: 3 | Status: SHIPPED | OUTPATIENT
Start: 2022-12-12

## 2022-12-12 RX ORDER — SERTRALINE HYDROCHLORIDE 20 MG/ML
28 SOLUTION ORAL DAILY
Qty: 180 ML | Refills: 2 | Status: SHIPPED | OUTPATIENT
Start: 2022-12-12

## 2022-12-12 RX ORDER — CALCIUM CITRATE/VITAMIN D3 200MG-6.25
1 TABLET ORAL DAILY
Qty: 90 TABLET | Refills: 3 | Status: SHIPPED | OUTPATIENT
Start: 2022-12-12

## 2022-12-12 NOTE — TELEPHONE ENCOUNTER
Please call guardian to see if they liquid. If he does, please call [facility] to see if they have it. I know they do a lot of compounds. Doni, karoline

## 2022-12-12 NOTE — PROGRESS NOTES
"Chief Complaint  Annual Exam    Subjective          Rakesh Mitchell presents to [facility] for annual exam.  Patient is here with guardian.  Patient takes Zoloft and trazodone for his agitation.  He takes Zyrtec Flonase and Singulair for his allergies.  Patient goes to the dentist and optometrist routinely.  He is due for his tdap.  Patient and guardian do not have any acute concerns at this time.      Review of Systems   Constitutional: Negative for fatigue.   HENT: Negative for hearing loss and trouble swallowing.    Eyes: Negative for blurred vision.   Respiratory: Negative for cough and shortness of breath.    Cardiovascular: Negative for chest pain, palpitations and leg swelling.   Gastrointestinal: Negative for abdominal pain, constipation, diarrhea, nausea and vomiting.   Genitourinary: Negative for dysuria, frequency and urgency.   Musculoskeletal: Negative for myalgias.   Skin: Negative for color change and rash.   Neurological: Negative for dizziness and headache.   Psychiatric/Behavioral: Negative for sleep disturbance, suicidal ideas and depressed mood. The patient is not nervous/anxious.          Objective   Vital Signs:   Vitals:    12/12/22 0812   BP: 121/72   BP Location: Left arm   Patient Position: Sitting   Pulse: 69   Weight: 56.1 kg (123 lb 9.6 oz)   Height: 154.9 cm (60.98\")       Physical Exam  Vitals reviewed.   Constitutional:       General: He is not in acute distress.     Appearance: Normal appearance. He is not diaphoretic.   HENT:      Head: Normocephalic and atraumatic. Hair is normal.      Right Ear: Hearing and external ear normal. There is impacted cerumen.      Left Ear: Hearing and external ear normal. There is impacted cerumen.      Nose: Nose normal. No nasal deformity.      Mouth/Throat:      Mouth: Mucous membranes are moist. No oral lesions.      Pharynx: Uvula midline. No uvula swelling.   Eyes:      General: Lids are normal. No scleral " icterus.        Right eye: No discharge.         Left eye: No discharge.      Extraocular Movements: Extraocular movements intact.      Right eye: Normal extraocular motion and no nystagmus.      Left eye: Normal extraocular motion and no nystagmus.      Conjunctiva/sclera: Conjunctivae normal.      Pupils: Pupils are equal, round, and reactive to light.   Neck:      Thyroid: No thyromegaly.   Cardiovascular:      Rate and Rhythm: Normal rate and regular rhythm.      Pulses: Normal pulses.      Heart sounds: Normal heart sounds. No murmur heard.    No gallop.   Pulmonary:      Effort: Pulmonary effort is normal. No respiratory distress.      Breath sounds: Normal breath sounds. No wheezing or rales.   Chest:      Chest wall: No tenderness.   Abdominal:      General: Bowel sounds are normal. There is no distension.      Palpations: Abdomen is soft. There is no mass.      Tenderness: There is no abdominal tenderness. There is no guarding.      Hernia: No hernia is present.   Musculoskeletal:         General: No tenderness or deformity. Normal range of motion.      Cervical back: Normal range of motion and neck supple.   Lymphadenopathy:      Cervical: No cervical adenopathy.   Skin:     General: Skin is warm and dry.      Findings: No rash.   Neurological:      Mental Status: He is alert and oriented to person, place, and time.      Coordination: Coordination normal.   Psychiatric:         Mood and Affect: Mood normal.         Behavior: Behavior normal.         Thought Content: Thought content normal.         Judgment: Judgment normal.          Result Review :              Assessment and Plan    Diagnoses and all orders for this visit:    1. Routine general medical examination at a health care facility (Primary)  Comments:  Tetanus given today.  Routine blood work today.  Refills for the year provided.  Orders:  -     Comprehensive Metabolic Panel; Future  -     Lipid Panel; Future  -     Hepatitis C Antibody;  Future  -     Multiple Vitamins-Minerals (Multivitamin Gummies Mens) chewable tablet; Chew 1 each Daily.  Dispense: 90 tablet; Refill: 3    2. Encounter for immunization  -     Tdap Vaccine Greater Than or Equal To 6yo IM    3. Impacted cerumen of both ears  Comments:  Irrigation in office.  Orders:  -     Ear Cerumen Removal    4. Seasonal allergies  Comments:  Refills for the year provided.  Stable.  Orders:  -     Cetirizine HCl (zyrTEC) 1 MG/ML syrup; Take 10 mL by mouth Daily.  Dispense: 900 mL; Refill: 3  -     fluticasone (Flonase) 50 MCG/ACT nasal spray; 2 sprays into the nostril(s) as directed by provider Daily.  Dispense: 15.8 mL; Refill: 11  -     montelukast (SINGULAIR) 5 MG chewable tablet; Chew 2 tablets Every Night.  Dispense: 180 tablet; Refill: 3    5. Agitation  Comments:  Refills for the year provided.  Stable.  Orders:  -     sertraline (ZOLOFT) 20 MG/ML concentrated solution; Take 1.4 mL by mouth Daily.  Dispense: 180 mL; Refill: 2  -     TRAZODONE 100MG/5ML LIQUID; Take 2.5 mL by mouth Every Night.  Dispense: 250 mL; Refill: 3    Patient to notify office with any acute concerns or issues.  Patient verbalizes understanding, agrees with plan of care and has no further questions upon discharge.    Please note that portions of this note were completed with a voice recognition program.    Follow Up    Return in about 1 year (around 12/12/2023) for Annual physical.  Patient was given instructions and counseling regarding his condition or for health maintenance advice. Please see specific information pulled into the AVS if appropriate.

## 2022-12-12 NOTE — TELEPHONE ENCOUNTER
Per Sofia, they do not make trazodone liquid any longer.  If must be liquid the rx would have to go to a compound pharmacy.  Please advise.

## 2022-12-29 ENCOUNTER — TELEPHONE (OUTPATIENT)
Dept: FAMILY MEDICINE CLINIC | Age: 32
End: 2022-12-29

## 2022-12-29 NOTE — TELEPHONE ENCOUNTER
Caller: MARLO CHUNG    Relationship: Emergency Contact    Best call back number: [phone]    What is the best time to reach you: ANYTIME   Who are you requesting to speak with (clinical staff, provider,  specific staff member): CLINICAL       What was the call regarding:       MARLO CHUNG IS REQUESTING A COLOGUARD FOR THE PATIENT. SHE SAID   WHEN HE IS HAVING A BOWEL MOVEMENT HE IS HAVING AN EXCESSIVE AMOUNT EACH TIME. SHE SAID HE GOES TO THE BATHROOM FOR A  BOWEL MOVEMENT 3 TO 4 TIMES A DAY. SHE SAID EVERY TIME HE HAS A BOWEL MOVEMENT HE VOMITS. SHE SAID THE PATIENT HAS LOST WEIGHT AS WELL.       SHE IS REQUESTING A CALL BACK TO DISCUSS.      Do you require a callback:  YES

## 2022-12-30 NOTE — TELEPHONE ENCOUNTER
Spoke to pt's care giver and appt made.  Also she is requesting an order to give to [facility] to request a staff member to help him with cleaning himself after using the restroom.  Please advise.

## 2023-01-06 ENCOUNTER — OFFICE VISIT (OUTPATIENT)
Dept: FAMILY MEDICINE CLINIC | Age: 33
End: 2023-01-06
Payer: MEDICAID

## 2023-01-06 VITALS
HEART RATE: 83 BPM | WEIGHT: 125.2 LBS | SYSTOLIC BLOOD PRESSURE: 118 MMHG | HEIGHT: 61 IN | DIASTOLIC BLOOD PRESSURE: 69 MMHG | BODY MASS INDEX: 23.64 KG/M2

## 2023-01-06 DIAGNOSIS — K59.00 CONSTIPATION, UNSPECIFIED CONSTIPATION TYPE: Primary | ICD-10-CM

## 2023-01-06 PROCEDURE — 99213 OFFICE O/P EST LOW 20 MIN: CPT | Performed by: NURSE PRACTITIONER

## 2023-01-06 RX ORDER — POLYETHYLENE GLYCOL 3350 17 G/17G
POWDER, FOR SOLUTION ORAL
Qty: 300 G | Refills: 1 | Status: SHIPPED | OUTPATIENT
Start: 2023-01-06

## 2023-01-06 RX ORDER — DOCUSATE SODIUM 100 MG/1
100 CAPSULE, LIQUID FILLED ORAL DAILY PRN
Qty: 90 CAPSULE | Refills: 3 | Status: SHIPPED | OUTPATIENT
Start: 2023-01-06 | End: 2023-02-14

## 2023-01-06 NOTE — PROGRESS NOTES
[facility] Complaint  Bowel Problems (X 2 months, anal leakage, not passing much stool ,did have one very large bowel movement recently. Usually home health changes him x 4 day and recently she hasn't had to change him at all.  )    Wayne Mitchell presents to [facility] with guardian complaining of constipation and anal leakage x2 months.  Patient gets green vegetables daily.  Patient states that his grandfather used to give him MiraLAX routinely.  He will now go several days without having a bowel movement and then experience pain.  Guardian seems to think that it is too to this pain that he is not wanting to go.  Denies fever, chills, shortness of air or chest pain.      Objective   Vital Signs:   Vitals:    01/06/23 1620   BP: 118/69   BP Location: Left arm   Patient Position: Sitting   Cuff Size: Adult   Pulse: 83   Weight: 56.8 kg (125 lb 3.2 oz)   Height: 154.9 cm (60.98\")       Physical Exam  Vitals reviewed.   Constitutional:       General: He is not in acute distress.     Appearance: Normal appearance. He is well-developed.   HENT:      Head: Normocephalic and atraumatic.   Eyes:      Conjunctiva/sclera: Conjunctivae normal.      Pupils: Pupils are equal, round, and reactive to light.   Cardiovascular:      Rate and Rhythm: Normal rate and regular rhythm.      Heart sounds: Normal heart sounds. No murmur heard.  Pulmonary:      Effort: Pulmonary effort is normal. No respiratory distress.      Breath sounds: Normal breath sounds.   Abdominal:      General: Bowel sounds are normal. There is no distension.      Palpations: Abdomen is soft. There is no mass.      Tenderness: There is no abdominal tenderness.      Hernia: No hernia is present.   Skin:     General: Skin is warm and dry.   Neurological:      Mental Status: He is alert and oriented to person, place, and time.   Psychiatric:         Mood and Affect: Mood and affect normal.         Behavior:  Behavior normal.         Thought Content: Thought content normal.         Judgment: Judgment normal.          Result Review :              Assessment and Plan    Diagnoses and all orders for this visit:    1. Constipation, unspecified constipation type (Primary)  Comments:  Patient to increase water intake and continue fiber intake.  Take MiraLAX daily x1 week and then as needed.  Colace daily as needed as well.  Orders:  -     polyethylene glycol (MiraLax) 17 GM/SCOOP powder; 17 gm po daily x 1 week then daily prn constipation.  Dispense: 300 g; Refill: 1  -     docusate sodium (Colace) 100 MG capsule; Take 1 capsule by mouth Daily As Needed for Constipation.  Dispense: 90 capsule; Refill: 3    Go to ED with any severe abdominal pain.  Patient/guardian to notify office with any acute concerns or issues.  They verbalize understanding, agrees with plan of care and had no further questions upon discharge.    Please note that portions of this note were completed with a voice recognition program.    Follow Up    Return if symptoms worsen or fail to improve.  Patient was given instructions and counseling regarding his condition or for health maintenance advice. Please see specific information pulled into the AVS if appropriate.

## 2023-01-11 ENCOUNTER — TELEPHONE (OUTPATIENT)
Dept: FAMILY MEDICINE CLINIC | Age: 33
End: 2023-01-11

## 2023-01-11 RX ORDER — PETROLATUM,WHITE/LANOLIN
1 OINTMENT (GRAM) TOPICAL AS NEEDED
Qty: 113 G | Refills: 11 | Status: SHIPPED | OUTPATIENT
Start: 2023-01-11

## 2023-01-11 NOTE — TELEPHONE ENCOUNTER
Caller: MARLO CHUNG    Relationship: Emergency Contact    Best call back number: [phone]    What medication are you requesting: REQUESTING A&D OINTMENT FOR OVER THE COUNTER USE AS NEEDED    What are your current symptoms: RASH ON BOTTOM    How long have you been experiencing symptoms: 3 WEEKS    Have you had these symptoms before:    [x] Yes  [] No    Have you been treated for these symptoms before:   [x] Yes  [] No    If a prescription is needed, what is your preferred pharmacy and phone number:  [facility] #[id number] - [address] - [phone] [facility] [phone]   [phone]    Additional notes:

## 2023-02-14 ENCOUNTER — TELEPHONE (OUTPATIENT)
Dept: FAMILY MEDICINE CLINIC | Age: 33
End: 2023-02-14

## 2023-02-14 DIAGNOSIS — K59.00 CONSTIPATION, UNSPECIFIED CONSTIPATION TYPE: ICD-10-CM

## 2023-02-14 RX ORDER — DOCUSATE SODIUM 100 MG/1
100 CAPSULE, LIQUID FILLED ORAL NIGHTLY
Qty: 90 CAPSULE | Refills: 3 | Status: SHIPPED | OUTPATIENT
Start: 2023-02-14 | End: 2023-07-24

## 2023-02-14 NOTE — TELEPHONE ENCOUNTER
Caller: MARLO CHUNG    Relationship: Emergency Contact    Best call back number: [phone]    What is the best time to reach you: ANYTIME     Who are you requesting to speak with (clinical staff, provider,  specific staff member): NURSE OR JP MOISE         What was the call regarding: CARE GIVER, IS CALLING STATING THAT SHE NEEDS A NEW SCRIPT  SENT IN FOR THE STOOL SOFTENER AND IT NEEDS TO READ GIVE ONE DAILY AT NIGHT.  AND ALSO WANTED TO CHECK WITH PCP TO SEE IF SHE WANTED TO GO AHEAD AND SCHEDULE A SCOPE TO MAKE SURE NOTHING ELSE WAS GOING ON.      Do you require a callback: YES

## 2023-03-01 ENCOUNTER — TELEPHONE (OUTPATIENT)
Dept: FAMILY MEDICINE CLINIC | Age: 33
End: 2023-03-01
Payer: MEDICAID

## 2023-03-01 NOTE — TELEPHONE ENCOUNTER
Celled Marily at [phone], [facility] at 3:42 PM. Received message from  stating [facility] has sent 3 faxes for Annual Plan of treatment. Was informed it needed to be sign and return by 3/1/23 for the patient to attend. I have not seen any faxes or messages in patient chart. Tried to contact Marily about this but was unsuccessful.

## 2023-03-01 NOTE — TELEPHONE ENCOUNTER
Spoke with Marily at [facility]. Last annual plan was 2/2/22. Printer out and just need Indira Vittitow to sign new one and return on 3/2/23. Must be returned and sign by 3/2/23 or patient will not be able to complete

## 2023-03-08 DIAGNOSIS — T78.40XD ALLERGY, SUBSEQUENT ENCOUNTER: ICD-10-CM

## 2023-03-09 ENCOUNTER — PRIOR AUTHORIZATION (OUTPATIENT)
Dept: FAMILY MEDICINE CLINIC | Age: 33
End: 2023-03-09
Payer: MEDICAID

## 2023-03-09 NOTE — TELEPHONE ENCOUNTER
Please ask caregiver if they want liquid or pills. Liquid is what is active on drug list. karoline Marion

## 2023-03-10 RX ORDER — CETIRIZINE HYDROCHLORIDE 10 MG/1
TABLET ORAL
Qty: 30 TABLET | Refills: 11 | OUTPATIENT
Start: 2023-03-10

## 2023-05-17 DIAGNOSIS — J30.2 SEASONAL ALLERGIES: ICD-10-CM

## 2023-05-17 RX ORDER — CETIRIZINE HYDROCHLORIDE 1 MG/ML
10 SOLUTION ORAL DAILY
Qty: 900 ML | Refills: 3 | Status: SHIPPED | OUTPATIENT
Start: 2023-05-17

## 2023-05-23 ENCOUNTER — OFFICE VISIT (OUTPATIENT)
Dept: FAMILY MEDICINE CLINIC | Age: 33
End: 2023-05-23
Payer: MEDICAID

## 2023-05-23 ENCOUNTER — HOSPITAL ENCOUNTER (OUTPATIENT)
Dept: GENERAL RADIOLOGY | Facility: HOSPITAL | Age: 33
Discharge: HOME | End: 2023-05-23
Admitting: NURSE PRACTITIONER
Payer: MEDICAID

## 2023-05-23 VITALS
DIASTOLIC BLOOD PRESSURE: 75 MMHG | OXYGEN SATURATION: 100 % | HEIGHT: 61 IN | SYSTOLIC BLOOD PRESSURE: 120 MMHG | HEART RATE: 78 BPM | BODY MASS INDEX: 22.92 KG/M2 | WEIGHT: 121.4 LBS | TEMPERATURE: 97.9 F

## 2023-05-23 DIAGNOSIS — R11.2 NAUSEA AND VOMITING, UNSPECIFIED VOMITING TYPE: Primary | ICD-10-CM

## 2023-05-23 DIAGNOSIS — T78.40XD ALLERGY, SUBSEQUENT ENCOUNTER: ICD-10-CM

## 2023-05-23 DIAGNOSIS — K59.00 CONSTIPATION, UNSPECIFIED CONSTIPATION TYPE: ICD-10-CM

## 2023-05-23 DIAGNOSIS — R11.2 NAUSEA AND VOMITING, UNSPECIFIED VOMITING TYPE: ICD-10-CM

## 2023-05-23 PROCEDURE — 74018 RADEX ABDOMEN 1 VIEW: CPT

## 2023-05-23 RX ORDER — LACTULOSE 10 G/15ML
20 SOLUTION ORAL 2 TIMES DAILY PRN
Qty: 150 ML | Refills: 2 | Status: SHIPPED | OUTPATIENT
Start: 2023-05-23

## 2023-05-23 RX ORDER — ONDANSETRON 4 MG/1
4 TABLET, ORALLY DISINTEGRATING ORAL EVERY 8 HOURS PRN
Qty: 15 TABLET | Refills: 0 | Status: SHIPPED | OUTPATIENT
Start: 2023-05-23

## 2023-05-23 RX ORDER — DIPHENHYDRAMINE HCL 12.5MG/5ML
25 LIQUID (ML) ORAL NIGHTLY PRN
Qty: 180 ML | Refills: 2 | Status: SHIPPED | OUTPATIENT
Start: 2023-05-23

## 2023-05-23 NOTE — PROGRESS NOTES
"Chief Complaint  Anxiety (Sx started last night ) and Fever (Pt is accompanied by [facility] anthony and she states that they took his temp at [facility] and said he needed to be picked up )    Subjective        Rakesh Mitchell presents to [facility]  Vomiting   This is a new problem. The current episode started yesterday. The problem occurs 5 to 10 times per day. The emesis has an appearance of stomach contents. Associated symptoms include abdominal pain and a fever. Pertinent negatives include no headaches.       Objective   Vital Signs:  /75 (BP Location: Left arm, Patient Position: Sitting, Cuff Size: Adult)   Pulse 78   Temp 97.9 °F (36.6 °C) (Oral)   Ht 154.9 cm (60.98\")   Wt 55.1 kg (121 lb 6.4 oz)   SpO2 100% Comment: room air  BMI 22.95 kg/m²   Estimated body mass index is 22.95 kg/m² as calculated from the following:    Height as of this encounter: 154.9 cm (60.98\").    Weight as of this encounter: 55.1 kg (121 lb 6.4 oz).       BMI is within normal parameters. No other follow-up for BMI required.      Physical Exam  HENT:      Head: Normocephalic.   Cardiovascular:      Rate and Rhythm: Normal rate and regular rhythm.   Pulmonary:      Effort: Pulmonary effort is normal.   Abdominal:      General: Bowel sounds are decreased. There is distension.      Tenderness: There is abdominal tenderness. There is no guarding.   Skin:     General: Skin is warm and dry.   Neurological:      Mental Status: He is alert and oriented to person, place, and time.   Psychiatric:         Mood and Affect: Mood normal.        Result Review :                   Assessment and Plan   Diagnoses and all orders for this visit:    1. Nausea and vomiting, unspecified vomiting type (Primary)  -     XR Abdomen KUB; Future  -     ondansetron ODT (ZOFRAN-ODT) 4 MG disintegrating tablet; Place 1 tablet on the tongue Every 8 (Eight) Hours As Needed for Nausea or Vomiting.  Dispense: 15 tablet; Refill: 0    2. " Constipation, unspecified constipation type  Comments:  Will try to get sooner GI appointment, willing to travel  Orders:  -     XR Abdomen KUB; Future  -     lactulose (CHRONULAC) 10 GM/15ML solution; Take 30 mL by mouth 2 (Two) Times a Day As Needed (Constipation).  Dispense: 150 mL; Refill: 2    3. Allergy, subsequent encounter  -     diphenhydrAMINE (BENADRYL) 12.5 MG/5ML elixir; Take 10 mL by mouth At Night As Needed for Allergies.  Dispense: 180 mL; Refill: 2             Follow Up   Return if symptoms worsen or fail to improve.  Patient was given instructions and counseling regarding his condition or for health maintenance advice. Please see specific information pulled into the AVS if appropriate.

## 2023-05-25 ENCOUNTER — OFFICE VISIT (OUTPATIENT)
Dept: GASTROENTEROLOGY | Facility: CLINIC | Age: 33
End: 2023-05-25
Payer: MEDICAID

## 2023-05-25 VITALS
BODY MASS INDEX: 23.83 KG/M2 | WEIGHT: 126.2 LBS | DIASTOLIC BLOOD PRESSURE: 73 MMHG | TEMPERATURE: 97.1 F | OXYGEN SATURATION: 98 % | SYSTOLIC BLOOD PRESSURE: 113 MMHG | HEART RATE: 70 BPM | HEIGHT: 61 IN

## 2023-05-25 DIAGNOSIS — R11.2 NAUSEA AND VOMITING, UNSPECIFIED VOMITING TYPE: Primary | ICD-10-CM

## 2023-05-25 DIAGNOSIS — K59.00 CONSTIPATION, UNSPECIFIED CONSTIPATION TYPE: ICD-10-CM

## 2023-05-25 PROCEDURE — 1159F MED LIST DOCD IN RCRD: CPT | Performed by: INTERNAL MEDICINE

## 2023-05-25 PROCEDURE — 1160F RVW MEDS BY RX/DR IN RCRD: CPT | Performed by: INTERNAL MEDICINE

## 2023-05-25 PROCEDURE — 99203 OFFICE O/P NEW LOW 30 MIN: CPT | Performed by: INTERNAL MEDICINE

## 2023-05-25 RX ORDER — SODIUM CHLORIDE, SODIUM LACTATE, POTASSIUM CHLORIDE, CALCIUM CHLORIDE 600; 310; 30; 20 MG/100ML; MG/100ML; MG/100ML; MG/100ML
30 INJECTION, SOLUTION INTRAVENOUS CONTINUOUS
OUTPATIENT
Start: 2023-05-25

## 2023-05-25 RX ORDER — DIPHENHYDRAMINE HYDROCHLORIDE 12.5 MG/5ML
SOLUTION ORAL
COMMUNITY
Start: 2023-05-23

## 2023-05-25 NOTE — PROGRESS NOTES
"Chief Complaint   Patient presents with   • Constipation        Rakesh Mitchell is a  32 y.o. male here for an initial visit for nausea with vomiting, constipation    HPI this 32-year-old white male patient of Indira TRAMMELL has been dealing with problems of nausea and vomiting usually after coughing stimulus according to his caretaker.  This has been going on for the past 8 months.  She believes some of this might be psychogenic but he does complain of coughing up food.  She believes most of what he coughs up is mucus.  He also has had issues with constipation at least for the past 5 months.  Routine bowel pattern used to be multiple stools per day and now he is having occasional bowel movements and also \"leakage\".  I advised he may need a KUB to assess for fecal burden and eventual colonoscopic examination.  We will schedule his upper endoscopy and his x-ray at this time.  On further review he had undergone KUB 2 days ago which revealed fecal impaction.  He would warrant disimpaction and routine management with bowel regimen.    Past Medical History:   Diagnosis Date   • Allergic    • Incontinent of feces    • Low back pain    • Mental impairment    • Tethered cord        Current Outpatient Medications   Medication Sig Dispense Refill   • Allergy Relief Childrens 12.5 MG/5ML liquid TAKE 2 (TWO) TEASPOONSFUL (10ML) BY MOUTH AT NIGHT AS NEEDED FOR ALLERGIES     • CBD (cannabidiol) oral oil Take  by mouth 2 (Two) Times a Day.     • Cetirizine HCl (zyrTEC) 1 MG/ML syrup Take 10 mL by mouth Daily. 900 mL 3   • diphenhydrAMINE (BENADRYL) 12.5 MG/5ML elixir Take 10 mL by mouth At Night As Needed for Allergies. 180 mL 2   • docusate sodium (Colace) 100 MG capsule Take 1 capsule by mouth Every Night. 90 capsule 3   • fluticasone (Flonase) 50 MCG/ACT nasal spray 2 sprays into the nostril(s) as directed by provider Daily. 15.8 mL 11   • lactulose (CHRONULAC) 10 GM/15ML solution Take 30 mL by mouth 2 (Two) Times a Day As " Needed (Constipation). 150 mL 2   • montelukast (SINGULAIR) 5 MG chewable tablet Chew 2 tablets Every Night. 180 tablet 3   • Multiple Vitamins-Minerals (Multivitamin Gummies Mens) chewable tablet Chew 1 each Daily. 90 tablet 3   • ondansetron ODT (ZOFRAN-ODT) 4 MG disintegrating tablet Place 1 tablet on the tongue Every 8 (Eight) Hours As Needed for Nausea or Vomiting. 15 tablet 0   • sertraline (ZOLOFT) 20 MG/ML concentrated solution Take 1.4 mL by mouth Daily. 180 mL 2   • traZODone (DESYREL) 50 MG tablet Take 1 tablet by mouth Every Night. 90 tablet 1   • Unable to find 1 each 1 (One) Time. Med Name: Boy Vitamins     • Vitamins A & D (vitamin A & D) ointment Apply 1 application topically to the appropriate area as directed As Needed (skin irritation). 113 g 11     No current facility-administered medications for this visit.       PRN Meds:.    No Known Allergies    Social History     Socioeconomic History   • Marital status: Single   Tobacco Use   • Smoking status: Never   • Smokeless tobacco: Never   Vaping Use   • Vaping Use: Never used   Substance and Sexual Activity   • Alcohol use: Never   • Drug use: Never       Family History   Family history unknown: Yes       Review of Systems   Constitutional: Negative for activity change, appetite change, fatigue and unexpected weight change.   HENT: Negative for congestion, facial swelling, sore throat, trouble swallowing and voice change.    Eyes: Negative for photophobia and visual disturbance.   Respiratory: Negative for cough and choking.    Cardiovascular: Negative for chest pain.   Gastrointestinal: Positive for constipation, nausea and vomiting. Negative for abdominal distention, abdominal pain, anal bleeding, blood in stool, diarrhea and rectal pain.   Endocrine: Negative for polyphagia.   Musculoskeletal: Negative for arthralgias, gait problem and joint swelling.   Skin: Negative for color change, pallor and rash.   Allergic/Immunologic: Negative for food  "allergies.   Neurological: Negative for speech difficulty and headaches.   Hematological: Does not bruise/bleed easily.   Psychiatric/Behavioral: Negative for agitation, confusion and sleep disturbance.       Vitals:    05/25/23 1613   BP: 113/73   Pulse: 70   Temp: 97.1 °F (36.2 °C)   SpO2: 98%       Physical Exam  Vitals reviewed.   Constitutional:       General: He is not in acute distress.     Appearance: He is well-developed. He is not diaphoretic.   HENT:      Head: Normocephalic.      Mouth/Throat:      Pharynx: No oropharyngeal exudate.   Eyes:      General: No scleral icterus.     Conjunctiva/sclera: Conjunctivae normal.   Neck:      Thyroid: No thyromegaly.   Cardiovascular:      Rate and Rhythm: Normal rate and regular rhythm.      Heart sounds: No murmur heard.  Pulmonary:      Effort: No respiratory distress.      Breath sounds: Normal breath sounds. No wheezing or rales.   Abdominal:      General: Bowel sounds are normal. There is no distension.      Palpations: Abdomen is soft. There is no mass.      Tenderness: There is no abdominal tenderness.   Musculoskeletal:         General: No tenderness. Normal range of motion.      Cervical back: Normal range of motion.   Lymphadenopathy:      Cervical: No cervical adenopathy.   Skin:     General: Skin is warm and dry.      Findings: No erythema or rash.   Neurological:      Mental Status: He is alert and oriented to person, place, and time.   Psychiatric:         Behavior: Behavior normal.         ASSESSMENT   #1 nausea with vomiting: Chronic issue of undefined etiology, seems associated with coughing spells  #2 constipation: Not clear as to what precipitates this  #3 bowel \"leakage\": May be consistent with constipation and discharge of liquid stool around the bolus.  Recent KUB substantiates this      PLAN  Schedule EGD  Initiate bowel regimen to include routine MiraLAX as well as suppository.  If minimal response will need disimpaction      ICD-10-CM " ICD-9-CM   1. Nausea and vomiting, unspecified vomiting type  R11.2 787.01   2. Constipation, unspecified constipation type  K59.00 564.00

## 2023-05-26 ENCOUNTER — TELEPHONE (OUTPATIENT)
Dept: FAMILY MEDICINE CLINIC | Age: 33
End: 2023-05-26

## 2023-05-26 NOTE — TELEPHONE ENCOUNTER
Caller: MARLO CHUNG    Relationship: Emergency Contact    Best call back number: [phone]    What medications are you currently taking:   Current Outpatient Medications on File Prior to Visit   Medication Sig Dispense Refill   • Allergy Relief Childrens 12.5 MG/5ML liquid TAKE 2 (TWO) TEASPOONSFUL (10ML) BY MOUTH AT NIGHT AS NEEDED FOR ALLERGIES     • CBD (cannabidiol) oral oil Take  by mouth 2 (Two) Times a Day.     • Cetirizine HCl (zyrTEC) 1 MG/ML syrup Take 10 mL by mouth Daily. 900 mL 3   • diphenhydrAMINE (BENADRYL) 12.5 MG/5ML elixir Take 10 mL by mouth At Night As Needed for Allergies. 180 mL 2   • docusate sodium (Colace) 100 MG capsule Take 1 capsule by mouth Every Night. 90 capsule 3   • fluticasone (Flonase) 50 MCG/ACT nasal spray 2 sprays into the nostril(s) as directed by provider Daily. 15.8 mL 11   • lactulose (CHRONULAC) 10 GM/15ML solution Take 30 mL by mouth 2 (Two) Times a Day As Needed (Constipation). 150 mL 2   • montelukast (SINGULAIR) 5 MG chewable tablet Chew 2 tablets Every Night. 180 tablet 3   • Multiple Vitamins-Minerals (Multivitamin Gummies Mens) chewable tablet Chew 1 each Daily. 90 tablet 3   • ondansetron ODT (ZOFRAN-ODT) 4 MG disintegrating tablet Place 1 tablet on the tongue Every 8 (Eight) Hours As Needed for Nausea or Vomiting. 15 tablet 0   • sertraline (ZOLOFT) 20 MG/ML concentrated solution Take 1.4 mL by mouth Daily. 180 mL 2   • traZODone (DESYREL) 50 MG tablet Take 1 tablet by mouth Every Night. 90 tablet 1   • Unable to find 1 each 1 (One) Time. Med Name: Boy Vitamins     • Vitamins A & D (vitamin A & D) ointment Apply 1 application topically to the appropriate area as directed As Needed (skin irritation). 113 g 11     No current facility-administered medications on file prior to visit.          When did you start taking these medications: 05/23/2023    Which medication are you concerned about:  LACTULOSE AND A POWDER MEDICATION USED TO TREAT THE SAME SYMPTOMS     Who  prescribed you this medication: WIN TELLEZ    What are your concerns: CANNOT TAKE BOTH MEDICATIONS AT THE SAME TIME NEW MEDICATION LACTULOSE IS WORKING WELL     How long have you had these concerns: SINCE NEW MEDICATION WAS PRESCRIBED   CALLER REQUESTED A DC OF THE POWDER MEDICATION BE FAXXED [phone] AND SHOULD BE DATED 05/23/2023  THEY HAVE A RECORD OF THE MEDICATION BUT IT DOES NOT SHOW UP ON OUR LIST   PLEASE CONTACT WITH ANY QUESTIONS

## 2023-05-30 NOTE — TELEPHONE ENCOUNTER
Spoke to Georgette and she was wanting more than 2 refills of the Lactulose.  I informed her that once those refills were used she can call back and Indira would send in more.  He did see the GI and none of his meds were changed.  No need for anything to be faxed over for D/C because Petty put in her OV note to D/C the Miralax while on the Lactulose.

## 2023-05-31 ENCOUNTER — TELEPHONE (OUTPATIENT)
Dept: GASTROENTEROLOGY | Facility: CLINIC | Age: 33
End: 2023-05-31

## 2023-05-31 PROBLEM — R11.2 NAUSEA AND VOMITING: Status: ACTIVE | Noted: 2023-05-31

## 2023-06-08 ENCOUNTER — TELEPHONE (OUTPATIENT)
Dept: GASTROENTEROLOGY | Facility: CLINIC | Age: 33
End: 2023-06-08
Payer: MEDICAID

## 2023-06-08 ENCOUNTER — OFFICE VISIT (OUTPATIENT)
Dept: FAMILY MEDICINE CLINIC | Age: 33
End: 2023-06-08
Payer: MEDICAID

## 2023-06-08 VITALS
OXYGEN SATURATION: 100 % | HEART RATE: 65 BPM | DIASTOLIC BLOOD PRESSURE: 72 MMHG | SYSTOLIC BLOOD PRESSURE: 120 MMHG | TEMPERATURE: 97.7 F | BODY MASS INDEX: 23 KG/M2 | HEIGHT: 61 IN | WEIGHT: 121.8 LBS

## 2023-06-08 DIAGNOSIS — R05.3 CHRONIC COUGH: ICD-10-CM

## 2023-06-08 DIAGNOSIS — F41.9 ANXIETY: Primary | ICD-10-CM

## 2023-06-08 DIAGNOSIS — K59.00 CONSTIPATION, UNSPECIFIED CONSTIPATION TYPE: ICD-10-CM

## 2023-06-08 PROCEDURE — 99213 OFFICE O/P EST LOW 20 MIN: CPT | Performed by: PHYSICIAN ASSISTANT

## 2023-06-08 RX ORDER — ONDANSETRON 4 MG/1
4 TABLET, FILM COATED ORAL EVERY 8 HOURS PRN
COMMUNITY

## 2023-06-08 RX ORDER — BISACODYL 10 MG
10 SUPPOSITORY, RECTAL RECTAL DAILY PRN
Qty: 28 SUPPOSITORY | Refills: 1 | Status: SHIPPED | OUTPATIENT
Start: 2023-06-08

## 2023-06-08 RX ORDER — HYDROXYZINE HCL 10 MG/5 ML
25 SOLUTION, ORAL ORAL 3 TIMES DAILY
Qty: 118 ML | Refills: 0 | Status: SHIPPED | OUTPATIENT
Start: 2023-06-08 | End: 2023-06-12 | Stop reason: DRUGHIGH

## 2023-06-08 RX ORDER — POLYETHYLENE GLYCOL 3350 17 G/17G
17 POWDER, FOR SOLUTION ORAL DAILY
Qty: 30 PACKET | Refills: 3 | Status: SHIPPED | OUTPATIENT
Start: 2023-06-08

## 2023-06-08 NOTE — TELEPHONE ENCOUNTER
GEO patient via telephone for. Scheduled EGD 8- with arrival time of 2:30PM. Prep paperwork mailed to verified address on file. Patient advised arrival time may change based on [facility] guidelines.

## 2023-06-08 NOTE — PROGRESS NOTES
"  Diagnoses and all orders for this visit:    1. Anxiety (Primary)  Comments:  Short supply given today.  Instructed to discontinue Benadryl and Zyrtec.  Follow-up with PCP for further refills/management.  Orders:  -     hydrOXYzine (ATARAX) 10 MG/5ML syrup; Take 12.5 mL by mouth 3 (Three) Times a Day.  Dispense: 118 mL; Refill: 0    2. Chronic cough  -     hydrOXYzine (ATARAX) 10 MG/5ML syrup; Take 12.5 mL by mouth 3 (Three) Times a Day.  Dispense: 118 mL; Refill: 0    3. Constipation, unspecified constipation type  Comments:  We will reach out to the gastroenterology office to try to get clarification regarding his bowel regimen.            Subjective     CHIEF COMPLAINT    Chief Complaint   Patient presents with    Vomiting     Pt caregiver states he is making himself vomit. She thinks this is due to his anxiety.             History of Present Illness  This is a 32-year-old male presenting to the clinic accompanied by his caregiver for \"he is making himself vomit.\"  She states this has been going on for several months and she has been in the office multiple times for this previously.  She describes these episodes as he has drainage that is making him cough to the point of vomiting.  She also states that sometimes she feels like he is getting so anxious that he is making himself vomit.  He was seen by gastroenterology recently and found to be severely constipated with possible impaction.  They recommended he begin a bowel regimen with \"MiraLAX and suppository,\" but his caregiver states she is unsure what suppository he is talking about and they have not received any MiraLAX.  The only thing he is taking for his bowels is lactulose and Colace.  He is otherwise feeling well with no fever or chills, abdominal pain, or other concerns.          Review of Systems   Constitutional:  Negative for chills and fever.   HENT:  Positive for postnasal drip.    Respiratory:  Positive for cough. Negative for shortness of breath " and wheezing.    Cardiovascular:  Negative for chest pain.   Gastrointestinal:  Positive for constipation and vomiting. Negative for abdominal pain.          Past Medical History:   Diagnosis Date    Allergic     Incontinent of feces     Low back pain     Mental impairment     Tethered cord             Past Surgical History:   Procedure Laterality Date    BACK SURGERY      COLONOSCOPY  2003            Family History   Family history unknown: Yes            Social History     Socioeconomic History    Marital status: Single   Tobacco Use    Smoking status: Never    Smokeless tobacco: Never   Vaping Use    Vaping Use: Never used   Substance and Sexual Activity    Alcohol use: Never    Drug use: Never            No Known Allergies         Current Outpatient Medications on File Prior to Visit   Medication Sig Dispense Refill    CBD (cannabidiol) oral oil Take  by mouth 2 (Two) Times a Day.      docusate sodium (Colace) 100 MG capsule Take 1 capsule by mouth Every Night. 90 capsule 3    fluticasone (Flonase) 50 MCG/ACT nasal spray 2 sprays into the nostril(s) as directed by provider Daily. 15.8 mL 11    lactulose (CHRONULAC) 10 GM/15ML solution Take 30 mL by mouth 2 (Two) Times a Day As Needed (Constipation). 150 mL 2    montelukast (SINGULAIR) 5 MG chewable tablet Chew 2 tablets Every Night. 180 tablet 3    Multiple Vitamins-Minerals (Multivitamin Gummies Mens) chewable tablet Chew 1 each Daily. 90 tablet 3    ondansetron (ZOFRAN) 4 MG tablet Take 1 tablet by mouth Every 8 (Eight) Hours As Needed for Nausea or Vomiting.      sertraline (ZOLOFT) 20 MG/ML concentrated solution Take 1.4 mL by mouth Daily. 180 mL 2    traZODone (DESYREL) 50 MG tablet Take 1 tablet by mouth Every Night. 90 tablet 1    Unable to find 1 each 1 (One) Time. Med Name: Boy Vitamins      Vitamins A & D (vitamin A & D) ointment Apply 1 application topically to the appropriate area as directed As Needed (skin irritation). 113 g 11    [DISCONTINUED]  "Allergy Relief Childrens 12.5 MG/5ML liquid TAKE 2 (TWO) TEASPOONSFUL (10ML) BY MOUTH AT NIGHT AS NEEDED FOR ALLERGIES      [DISCONTINUED] Cetirizine HCl (zyrTEC) 1 MG/ML syrup Take 10 mL by mouth Daily. 900 mL 3    [DISCONTINUED] diphenhydrAMINE (BENADRYL) 12.5 MG/5ML elixir Take 10 mL by mouth At Night As Needed for Allergies. 180 mL 2    [DISCONTINUED] ondansetron ODT (ZOFRAN-ODT) 4 MG disintegrating tablet Place 1 tablet on the tongue Every 8 (Eight) Hours As Needed for Nausea or Vomiting. 15 tablet 0     No current facility-administered medications on file prior to visit.            /72 (BP Location: Left arm, Patient Position: Sitting, Cuff Size: Small Adult)   Pulse 65   Temp 97.7 °F (36.5 °C) (Oral)   Ht 154.9 cm (60.98\")   Wt 55.2 kg (121 lb 12.8 oz)   SpO2 100% Comment: room air  BMI 23.03 kg/m²          Objective     Physical Exam  Vitals and nursing note reviewed.   Constitutional:       General: He is not in acute distress.  Cardiovascular:      Rate and Rhythm: Normal rate and regular rhythm.      Heart sounds: Normal heart sounds.   Pulmonary:      Effort: Pulmonary effort is normal. No respiratory distress.      Breath sounds: Normal breath sounds. No wheezing, rhonchi or rales.   Abdominal:      General: Bowel sounds are normal. There is distension.      Tenderness: There is no abdominal tenderness. There is no guarding or rebound.   Skin:     General: Skin is warm and dry.   Neurological:      Mental Status: He is alert.   Psychiatric:         Mood and Affect: Mood normal.         Behavior: Behavior normal.            Procedures              Office Visit with Valeria Dove APRN (05/23/2023)  Office Visit with Rusty Baker MD (05/25/2023)                 Diagnoses and all orders for this visit:    1. Anxiety (Primary)  Comments:  Short supply given today.  Instructed to discontinue Benadryl and Zyrtec.  Follow-up with PCP for further refills/management.  Orders:  -     " hydrOXYzine (ATARAX) 10 MG/5ML syrup; Take 12.5 mL by mouth 3 (Three) Times a Day.  Dispense: 118 mL; Refill: 0    2. Chronic cough  -     hydrOXYzine (ATARAX) 10 MG/5ML syrup; Take 12.5 mL by mouth 3 (Three) Times a Day.  Dispense: 118 mL; Refill: 0    3. Constipation, unspecified constipation type  Comments:  We will reach out to the gastroenterology office to try to get clarification regarding his bowel regimen.                           FOR FULL DISCHARGE INSTRUCTIONS/COMMENTS/HANDOUTS please see the   AVS

## 2023-06-08 NOTE — TELEPHONE ENCOUNTER
"Call received from Dona with JP [facility]'s office @ [phone].  She is requesting this office contact pt CGGeorgette, @ [phone] with instructions re: bowel regime.     Attempt call above # - disconnected.  Call to .  Review Dr Baker's instructions with o/v of 5/25/23: \"Initiate bowel regimen to include routine MiraLAX as well as suppository. If minimal response will need disimpaction \"    Georgette reports that must have prescriptions for above - even if OTC.  Escribe completed for miralax 17 g daily,  #30 packets, R3.  Doculax 10 mg supp - 1 supp into rectum daily as needed for constipation, #28, R1.   States has not yet initiated tx because did not purchase otc.  Will now begin.     Georgette reports that pt coughing until throws up - dk yellow emesis.  Passing scant amounts stool into brief.  Concerned re: bowel pattern/emesis.      Advise that DR Baker out of office - will send message to SHIRA Perez for any further recommendations.   "

## 2023-06-08 NOTE — TELEPHONE ENCOUNTER
I tried to call the number and there was no answer.  Mailbox was full so unable to leave message.  We will keep trying.

## 2023-06-09 NOTE — TELEPHONE ENCOUNTER
Spoke with his caregiver, Georgette.    She is giving lactulose 30mL BID, Miralax once daily, colace every night, and giving coffee daily. Still not going regularly, will have smears in briefs only. Only on trazodone at night.     KUB with large fecal impaction on 5/23/23. Recommend caretaker, Georgette, take him to the ED for disimpaction.     I asked her to update us next week.

## 2023-06-12 ENCOUNTER — TELEPHONE (OUTPATIENT)
Dept: FAMILY MEDICINE CLINIC | Age: 33
End: 2023-06-12

## 2023-06-12 ENCOUNTER — TELEPHONE (OUTPATIENT)
Dept: GASTROENTEROLOGY | Facility: CLINIC | Age: 33
End: 2023-06-12
Payer: MEDICAID

## 2023-06-12 DIAGNOSIS — F41.9 ANXIETY: ICD-10-CM

## 2023-06-12 DIAGNOSIS — R05.3 CHRONIC COUGH: ICD-10-CM

## 2023-06-12 DIAGNOSIS — K59.00 CONSTIPATION, UNSPECIFIED CONSTIPATION TYPE: Primary | ICD-10-CM

## 2023-06-12 RX ORDER — HYDROXYZINE HCL 10 MG/5 ML
25 SOLUTION, ORAL ORAL 3 TIMES DAILY
Qty: 118 ML | Refills: 0 | Status: CANCELLED | OUTPATIENT
Start: 2023-06-12

## 2023-06-12 RX ORDER — HYDROXYZINE HYDROCHLORIDE 25 MG/1
25 TABLET, FILM COATED ORAL 2 TIMES DAILY
Qty: 180 TABLET | Refills: 1 | Status: SHIPPED | OUTPATIENT
Start: 2023-06-12

## 2023-06-12 NOTE — TELEPHONE ENCOUNTER
Caller: MARLO CHUNG    Relationship: Emergency Contact    Best call back number: [phone]    What is the best time to reach you: ANYTIME    Who are you requesting to speak with (clinical staff, provider,  specific staff member): CLINICAL    What was the call regarding: PT'S CAREGIVER MARLO CALLED TO LET US KNOW SHE TOOK PT TO ER ON FRIDAY NIGHT AT 10PM FOR A D IMPACT. CAREGIVER MARLO SAID THAT PT IS CURRENTLY NOT HAVING ISSUES BUT IF NOT CHECKED IT CAN BE A PROBLEM FOR HIM BECAUSE OF HIS ANXIETY. SHE SAID THAT HER AGENCY THOUGHT WE NEEDED TO KNOW ABOUT THIS.     Is it okay if the provider responds through [facility]: YES. IT'S OK TO LVM.

## 2023-06-12 NOTE — TELEPHONE ENCOUNTER
Caller: MARLO CHUNG    Relationship: Emergency Contact    Best call back number: [phone]     Requested Prescriptions:   Requested Prescriptions     Pending Prescriptions Disp Refills    hydrOXYzine (ATARAX) 10 MG/5ML syrup 118 mL 0     Sig: Take 12.5 mL by mouth 3 (Three) Times a Day.        Pharmacy where request should be sent: [facility] [address] - [phone]  - [phone] FX     Last office visit with prescribing clinician: 1/6/2023   Last telemedicine visit with prescribing clinician: Visit date not found   Next office visit with prescribing clinician: 12/18/2023     Additional details provided by patient: PATIENT'S FHP CALLING TO REQUEST A REFILL TO BE CHANGED TO TWICE OR THREE TIMES DAILY AND NOT AS NEEDED. THIS MEDICATION WORKED WELL FOR THE PATIENT. SHE IS ALSO REQUESTING PILL FORM, CHEWABLE IF POSSIBLE, NOT LIQUID OR CAPSULE.    Does the patient have less than a 3 day supply:  [x] Yes  [] No    Would you like a call back once the refill request has been completed: [] Yes [] No    If the office needs to give you a call back, can they leave a voicemail: [] Yes [] No    Olga Lidia Oh Rep   06/12/23 10:50 EDT

## 2023-06-12 NOTE — TELEPHONE ENCOUNTER
Caller: MARLO CHUNG    Relationship: Emergency Contact    Best call back number: [phone]     What orders are you requesting (i.e. lab or imaging): XRAY     In what timeframe would the patient need to come in: ASAP    Where will you receive your lab/imaging services: [facility]    Additional notes: MARLO STATES THE PATIENT NEEDS ANOTHER XRAY TO SEE IF HIS BERNIE IS COMPACTED. MARLO STATES THE PLEASE GET THE ORDERS IN AS QUICKLY AS POSSIBLE AND CALL HER TO LET HER KNOW THEY'RE READY TO BE FULFILLED.

## 2023-06-12 NOTE — TELEPHONE ENCOUNTER
Since he is actively seeing GI, his GI dr would address. If they are not able to without an appointment, he will need to come in to be seen by acute care provider tomorrow. karoline Marion

## 2023-06-12 NOTE — TELEPHONE ENCOUNTER
PATIENT'S FHP CALLING TO REQUEST A REFILL TO BE CHANGED TO TWICE OR THREE TIMES DAILY AND NOT AS NEEDED. THIS MEDICATION WORKED WELL FOR THE PATIENT. SHE IS ALSO REQUESTING PILL FORM, CHEWABLE IF POSSIBLE, NOT LIQUID OR CAPSULE.

## 2023-06-13 NOTE — TELEPHONE ENCOUNTER
Caller: MARLO CHUNG    Relationship to patient: Emergency Contact    Best call back number: [phone]    Patient is needing: CLINICAL RETURN CALL. PT IS STILL CONSTIPATED. ER DID NOT GET RID OF IMPACTION. MARLO WAS TOLD PT COULD COME IN TO OFFICE FOR IMPACTION IF NECESSARY. WOULD LIKE PT TO HAVE ABD XRAY/SCAN TO SEE IF STILL IMPACTED.

## 2023-06-15 NOTE — TELEPHONE ENCOUNTER
Georgette notified of Dr Singleton  recommendations and verbalized understanding    [place] ordered, they are going to go by the hospital tomorrow

## 2023-06-16 ENCOUNTER — HOSPITAL ENCOUNTER (OUTPATIENT)
Dept: GENERAL RADIOLOGY | Facility: HOSPITAL | Age: 33
Discharge: HOME | End: 2023-06-16
Payer: MEDICAID

## 2023-06-16 ENCOUNTER — TELEPHONE (OUTPATIENT)
Dept: GASTROENTEROLOGY | Facility: CLINIC | Age: 33
End: 2023-06-16

## 2023-06-16 ENCOUNTER — OFFICE VISIT (OUTPATIENT)
Dept: FAMILY MEDICINE CLINIC | Age: 33
End: 2023-06-16
Payer: MEDICAID

## 2023-06-16 VITALS
TEMPERATURE: 98 F | OXYGEN SATURATION: 98 % | SYSTOLIC BLOOD PRESSURE: 120 MMHG | BODY MASS INDEX: 23.18 KG/M2 | DIASTOLIC BLOOD PRESSURE: 70 MMHG | HEART RATE: 80 BPM | HEIGHT: 61 IN | WEIGHT: 122.8 LBS

## 2023-06-16 DIAGNOSIS — K59.00 CONSTIPATION, UNSPECIFIED CONSTIPATION TYPE: Primary | ICD-10-CM

## 2023-06-16 DIAGNOSIS — K59.00 CONSTIPATION, UNSPECIFIED CONSTIPATION TYPE: ICD-10-CM

## 2023-06-16 PROCEDURE — 74018 RADEX ABDOMEN 1 VIEW: CPT

## 2023-06-16 PROCEDURE — 1160F RVW MEDS BY RX/DR IN RCRD: CPT | Performed by: PHYSICIAN ASSISTANT

## 2023-06-16 PROCEDURE — 99212 OFFICE O/P EST SF 10 MIN: CPT | Performed by: PHYSICIAN ASSISTANT

## 2023-06-16 PROCEDURE — 1159F MED LIST DOCD IN RCRD: CPT | Performed by: PHYSICIAN ASSISTANT

## 2023-06-16 NOTE — TELEPHONE ENCOUNTER
"    Caller: MARLO CHUNG    Relationship to patient: Emergency Contact    Best call back number: [phone]    Patient is needing: THE PATIENT'S CAREGIVER \"MARLO CHUNG\" NEEDS TO FIND OUT IF DR. PAINTER RECEIVED THE REPORT FROM THE [facility]. THE PATIENT HAS HAD BOWEL MOVEMENTS BUT SHE NEEDS A DOCTOR'S ORDER TO CONTINUE THE POWDER. THE CAREGIVER ACTUALLY NEEDS DOCTOR'S ORDER SHE CANNOT MAKE JUDGEMENT ON WHETHER TO GIVE THE MEDICINE. PLEASE CALL HER BACK AS SOON AS POSSIBLE. THE VISIT WITH THE EMERGENCY ROOM DID NOT HAVE MUCH SUCCESS. PLEASE CALL MARLO CHUNG [phone].          "

## 2023-06-16 NOTE — PROGRESS NOTES
"  Diagnoses and all orders for this visit:    1. Constipation, unspecified constipation type (Primary)  Comments:  Follow-up with gastroenterology for further management of constipation.  Defer to Dr. Baker for discontinuing medications related to this.            Subjective     CHIEF COMPLAINT    Chief Complaint   Patient presents with    Follow-up     Pt went to [facility] ER on 6/9 for severe constipation. Pt had XR done today at [facility] around noon.             History of Present Illness  This is a 32-year-old female presenting to the clinic accompanied by his caregiver for an ER follow-up visit.  He was seen at [facility] ER on 6/9/2023 for \"severe constipation and fecal impaction.\"  His caregiver reports that the ER \"did nothing for him\" and that they attempted an enema but were unsuccessful.  She brought discharge papers with her from [facility] which have provided instructions regarding laxatives and stool softeners.  She has been doing this and states Andrew has had multiple soft bowel movements over the last 5 days.  They are wanting an order to states okay to stop giving these medications.  He follows with Dr. Baker of gastroenterology for his chronic constipation and had a KUB performed today that their office ordered.  Results are not available for review yet.          Review of Systems   Constitutional:  Negative for fever.   Gastrointestinal:  Negative for abdominal pain, constipation, diarrhea and vomiting.          Past Medical History:   Diagnosis Date    Allergic     Incontinent of feces     Low back pain     Mental impairment     Tethered cord             Past Surgical History:   Procedure Laterality Date    BACK SURGERY      COLONOSCOPY  2003            Family History   Family history unknown: Yes            Social History     Socioeconomic History    Marital status: Single   Tobacco Use    Smoking status: Never    Smokeless tobacco: Never   Vaping Use    Vaping Use: Never used   Substance and " "Sexual Activity    Alcohol use: Never    Drug use: Never            No Known Allergies         Current Outpatient Medications on File Prior to Visit   Medication Sig Dispense Refill    bisacodyl (DULCOLAX) 10 MG suppository Insert 1 suppository into the rectum Daily As Needed for Constipation. 28 suppository 1    CBD (cannabidiol) oral oil Take  by mouth 2 (Two) Times a Day.      docusate sodium (Colace) 100 MG capsule Take 1 capsule by mouth Every Night. 90 capsule 3    fluticasone (Flonase) 50 MCG/ACT nasal spray 2 sprays into the nostril(s) as directed by provider Daily. 15.8 mL 11    hydrOXYzine (ATARAX) 25 MG tablet Take 1 tablet by mouth 2 (Two) Times a Day. 180 tablet 1    lactulose (CHRONULAC) 10 GM/15ML solution Take 30 mL by mouth 2 (Two) Times a Day As Needed (Constipation). 150 mL 2    montelukast (SINGULAIR) 5 MG chewable tablet Chew 2 tablets Every Night. 180 tablet 3    Multiple Vitamins-Minerals (Multivitamin Gummies Mens) chewable tablet Chew 1 each Daily. 90 tablet 3    ondansetron (ZOFRAN) 4 MG tablet Take 1 tablet by mouth Every 8 (Eight) Hours As Needed for Nausea or Vomiting.      polyethylene glycol (MIRALAX) 17 g packet Take 17 g by mouth Daily. 30 packet 3    sertraline (ZOLOFT) 20 MG/ML concentrated solution Take 1.4 mL by mouth Daily. 180 mL 2    traZODone (DESYREL) 50 MG tablet Take 1 tablet by mouth Every Night. 90 tablet 1    Unable to find 1 each 1 (One) Time. Med Name: Boy Vitamins      Vitamins A & D (vitamin A & D) ointment Apply 1 application topically to the appropriate area as directed As Needed (skin irritation). 113 g 11     No current facility-administered medications on file prior to visit.            /70 (BP Location: Left arm, Patient Position: Sitting, Cuff Size: Small Adult)   Pulse 80   Temp 98 °F (36.7 °C) (Oral)   Ht 154.9 cm (60.98\")   Wt 55.7 kg (122 lb 12.8 oz)   SpO2 98% Comment: room air  BMI 23.22 kg/m²          Objective     Physical Exam  Vitals " and nursing note reviewed.   Constitutional:       General: He is not in acute distress.     Appearance: Normal appearance.   Pulmonary:      Effort: Pulmonary effort is normal. No respiratory distress.   Abdominal:      General: Bowel sounds are normal. There is no distension.      Palpations: Abdomen is soft.      Tenderness: There is no abdominal tenderness.   Neurological:      Mental Status: He is alert. Mental status is at baseline.      Comments: Talkative, answers questions appropriately   Psychiatric:         Mood and Affect: Mood normal.         Behavior: Behavior normal.            Procedures                    Lab Results (last 24 hours)       ** No results found for the last 24 hours. **                  No Radiology Exams Resulted Within Past 24 Hours                    Diagnoses and all orders for this visit:    1. Constipation, unspecified constipation type (Primary)  Comments:  Follow-up with gastroenterology for further management of constipation.  Defer to Dr. Baker for discontinuing medications related to this.                           FOR FULL DISCHARGE INSTRUCTIONS/COMMENTS/HANDOUTS please see the   AVS

## 2023-06-20 NOTE — TELEPHONE ENCOUNTER
"Returned call to pts Caregiver passed on Dr Singleton recommendations, however pt is unable to take a suppository or enema due to being developmentally challenged stated he kicks and screams.  Said they were told in the ER that \"we could sedate him and remove impaction.\"    Pt is currently taking the following  MiraLax 17g packet BID  Lactulose 10gm/15ml 30mg BID  Colace 100mg 2 at HS    Cg stated the pt has been on this regimen since 6/9/23, since he is unable to take the enema or suppository, do you want him to try something else?    Georgette is requesting we fax a prescription to [phone] with the above bowel regimen, explained I will fax after discussing further with Dr Baker, angelaase he wants to add something else.  "

## 2023-06-27 PROBLEM — K59.00 CONSTIPATION: Status: ACTIVE | Noted: 2023-06-27

## 2023-07-24 ENCOUNTER — OFFICE VISIT (OUTPATIENT)
Dept: FAMILY MEDICINE CLINIC | Age: 33
End: 2023-07-24
Payer: MEDICAID

## 2023-07-24 VITALS
HEIGHT: 61 IN | SYSTOLIC BLOOD PRESSURE: 109 MMHG | TEMPERATURE: 97.7 F | BODY MASS INDEX: 23.22 KG/M2 | OXYGEN SATURATION: 99 % | DIASTOLIC BLOOD PRESSURE: 67 MMHG | HEART RATE: 59 BPM | WEIGHT: 123 LBS

## 2023-07-24 DIAGNOSIS — F41.9 ANXIETY: ICD-10-CM

## 2023-07-24 DIAGNOSIS — R45.1 AGITATION: Primary | ICD-10-CM

## 2023-07-24 DIAGNOSIS — K59.00 CONSTIPATION, UNSPECIFIED CONSTIPATION TYPE: ICD-10-CM

## 2023-07-24 PROCEDURE — 1159F MED LIST DOCD IN RCRD: CPT | Performed by: NURSE PRACTITIONER

## 2023-07-24 PROCEDURE — 1160F RVW MEDS BY RX/DR IN RCRD: CPT | Performed by: NURSE PRACTITIONER

## 2023-07-24 PROCEDURE — 99214 OFFICE O/P EST MOD 30 MIN: CPT | Performed by: NURSE PRACTITIONER

## 2023-07-24 RX ORDER — SERTRALINE HYDROCHLORIDE 100 MG/1
100 TABLET, FILM COATED ORAL DAILY
Qty: 180 TABLET | Refills: 1 | Status: SHIPPED | OUTPATIENT
Start: 2023-07-24

## 2023-07-24 RX ORDER — HYDROXYZINE 50 MG/1
50 TABLET, FILM COATED ORAL 2 TIMES DAILY
Qty: 180 TABLET | Refills: 1 | Status: SHIPPED | OUTPATIENT
Start: 2023-07-24

## 2023-07-24 NOTE — PROGRESS NOTES
"Chief Complaint  Anxiety (Would like hydroxyzine and zoloft increased)    Subjective          Rakesh Mitchell presents to [facility] here with caregiver.  She would like to discuss increasing Zoloft and hydroxyzine.  He has taken an increased dose in the past.  He was also on liquid but as long as tablet can be chewed or crushed, switch can be made.  She states the hydroxyzine really seems to help with agitation and nerves.  She states that it may need to increase increase as well.  He tolerates it well without being too drowsy.  Patient states he is doing well.  He goes to [facility].  His previous provider has left and he is meeting with a new 1 at the end of next month.      Objective   Vital Signs:   Vitals:    07/24/23 1014   BP: 109/67   BP Location: Left arm   Patient Position: Sitting   Cuff Size: Adult   Pulse: 59   Temp: 97.7 °F (36.5 °C)   TempSrc: Oral   SpO2: 99%   Weight: 55.8 kg (123 lb)   Height: 154.9 cm (60.98\")       Physical Exam  Vitals reviewed.   Constitutional:       General: He is not in acute distress.     Appearance: Normal appearance. He is well-developed.   HENT:      Head: Normocephalic and atraumatic.   Eyes:      Conjunctiva/sclera: Conjunctivae normal.      Pupils: Pupils are equal, round, and reactive to light.   Cardiovascular:      Rate and Rhythm: Normal rate and regular rhythm.      Heart sounds: Normal heart sounds. No murmur heard.  Pulmonary:      Effort: Pulmonary effort is normal. No respiratory distress.      Breath sounds: Normal breath sounds.   Skin:     General: Skin is warm and dry.   Neurological:      Mental Status: He is alert. Mental status is at baseline.   Psychiatric:         Mood and Affect: Mood and affect normal.         Behavior: Behavior normal.         Thought Content: Thought content normal.         Judgment: Judgment normal.        Result Review :              Assessment and Plan    Diagnoses and all orders " for this visit:    1. Agitation (Primary)  -     sertraline (Zoloft) 100 MG tablet; Take 1 tablet by mouth Daily.  Dispense: 180 tablet; Refill: 1  -     hydrOXYzine (ATARAX) 50 MG tablet; Take 1 tablet by mouth 2 (Two) Times a Day.  Dispense: 180 tablet; Refill: 1    2. Anxiety  -     sertraline (Zoloft) 100 MG tablet; Take 1 tablet by mouth Daily.  Dispense: 180 tablet; Refill: 1  -     hydrOXYzine (ATARAX) 50 MG tablet; Take 1 tablet by mouth 2 (Two) Times a Day.  Dispense: 180 tablet; Refill: 1    3. Constipation, unspecified constipation type  Comments:  Improved after procedure.    We are going to discontinue hydroxyzine 25 mg twice daily and increase to hydroxyzine 50 mg twice daily.  We are going to discontinue his Zoloft 28 mg daily, liquid concentration and increase to 100 mg twice daily (previously tolerated dose).  Explained to patient/caregiver that tablets can be crushed.    Patient to notify office with any acute concerns or issues.  Patient verbalizes understanding, agrees with plan of care and has no further questions upon discharge.    Please note that portions of this note were completed with a voice recognition program.    Follow Up    Return for Next scheduled follow up and keep f/u with Lashonda next month. .  Patient was given instructions and counseling regarding his condition or for health maintenance advice. Please see specific information pulled into the AVS if appropriate.

## 2023-08-11 ENCOUNTER — TELEPHONE (OUTPATIENT)
Dept: GASTROENTEROLOGY | Facility: CLINIC | Age: 33
End: 2023-08-11
Payer: MEDICAID

## 2023-08-18 ENCOUNTER — ANESTHESIA (OUTPATIENT)
Dept: GASTROENTEROLOGY | Facility: HOSPITAL | Age: 33
End: 2023-08-18
Payer: MEDICAID

## 2023-08-18 ENCOUNTER — HOSPITAL ENCOUNTER (OUTPATIENT)
Facility: HOSPITAL | Age: 33
Setting detail: HOSPITAL OUTPATIENT SURGERY
Discharge: HOME | End: 2023-08-18
Attending: INTERNAL MEDICINE | Admitting: INTERNAL MEDICINE
Payer: MEDICAID

## 2023-08-18 ENCOUNTER — ANESTHESIA EVENT (OUTPATIENT)
Dept: GASTROENTEROLOGY | Facility: HOSPITAL | Age: 33
End: 2023-08-18
Payer: MEDICAID

## 2023-08-18 VITALS
SYSTOLIC BLOOD PRESSURE: 99 MMHG | WEIGHT: 124.1 LBS | BODY MASS INDEX: 21.19 KG/M2 | RESPIRATION RATE: 20 BRPM | DIASTOLIC BLOOD PRESSURE: 58 MMHG | HEART RATE: 67 BPM | HEIGHT: 64 IN | OXYGEN SATURATION: 96 %

## 2023-08-18 DIAGNOSIS — R11.2 NAUSEA AND VOMITING, UNSPECIFIED VOMITING TYPE: ICD-10-CM

## 2023-08-18 PROCEDURE — S0260 H&P FOR SURGERY: HCPCS | Performed by: INTERNAL MEDICINE

## 2023-08-18 PROCEDURE — 88305 TISSUE EXAM BY PATHOLOGIST: CPT | Performed by: INTERNAL MEDICINE

## 2023-08-18 PROCEDURE — 87081 CULTURE SCREEN ONLY: CPT | Performed by: INTERNAL MEDICINE

## 2023-08-18 PROCEDURE — 43239 EGD BIOPSY SINGLE/MULTIPLE: CPT | Performed by: INTERNAL MEDICINE

## 2023-08-18 PROCEDURE — 25010000002 PROPOFOL 10 MG/ML EMULSION: Performed by: STUDENT IN AN ORGANIZED HEALTH CARE EDUCATION/TRAINING PROGRAM

## 2023-08-18 RX ORDER — SODIUM CHLORIDE, SODIUM LACTATE, POTASSIUM CHLORIDE, CALCIUM CHLORIDE 600; 310; 30; 20 MG/100ML; MG/100ML; MG/100ML; MG/100ML
30 INJECTION, SOLUTION INTRAVENOUS CONTINUOUS PRN
Status: DISCONTINUED | OUTPATIENT
Start: 2023-08-18 | End: 2023-08-18 | Stop reason: HOSPADM

## 2023-08-18 RX ORDER — LIDOCAINE HYDROCHLORIDE 20 MG/ML
INJECTION, SOLUTION INFILTRATION; PERINEURAL AS NEEDED
Status: DISCONTINUED | OUTPATIENT
Start: 2023-08-18 | End: 2023-08-18 | Stop reason: SURG

## 2023-08-18 RX ORDER — SODIUM CHLORIDE, SODIUM LACTATE, POTASSIUM CHLORIDE, CALCIUM CHLORIDE 600; 310; 30; 20 MG/100ML; MG/100ML; MG/100ML; MG/100ML
INJECTION, SOLUTION INTRAVENOUS CONTINUOUS PRN
Status: DISCONTINUED | OUTPATIENT
Start: 2023-08-18 | End: 2023-08-18 | Stop reason: SURG

## 2023-08-18 RX ORDER — PROPOFOL 10 MG/ML
VIAL (ML) INTRAVENOUS AS NEEDED
Status: DISCONTINUED | OUTPATIENT
Start: 2023-08-18 | End: 2023-08-18 | Stop reason: SURG

## 2023-08-18 RX ORDER — PROPOFOL 10 MG/ML
VIAL (ML) INTRAVENOUS CONTINUOUS PRN
Status: DISCONTINUED | OUTPATIENT
Start: 2023-08-18 | End: 2023-08-18 | Stop reason: SURG

## 2023-08-18 RX ADMIN — LIDOCAINE HYDROCHLORIDE 60 MG: 20 INJECTION, SOLUTION INFILTRATION; PERINEURAL at 15:20

## 2023-08-18 RX ADMIN — Medication 140 MCG/KG/MIN: at 15:20

## 2023-08-18 RX ADMIN — SODIUM CHLORIDE, POTASSIUM CHLORIDE, SODIUM LACTATE AND CALCIUM CHLORIDE 30 ML/HR: 600; 310; 30; 20 INJECTION, SOLUTION INTRAVENOUS at 14:39

## 2023-08-18 RX ADMIN — PROPOFOL 150 MG: 10 INJECTION, EMULSION INTRAVENOUS at 15:20

## 2023-08-18 RX ADMIN — PROPOFOL 100 MG: 10 INJECTION, EMULSION INTRAVENOUS at 15:26

## 2023-08-18 RX ADMIN — SODIUM CHLORIDE, SODIUM LACTATE, POTASSIUM CHLORIDE, AND CALCIUM CHLORIDE: 600; 310; 30; 20 INJECTION, SOLUTION INTRAVENOUS at 15:12

## 2023-08-18 NOTE — ANESTHESIA POSTPROCEDURE EVALUATION
Patient: Rakesh Mitchell    Procedure Summary       Date: 08/18/23 [facility] / [facility] /  [facility]    Anesthesia Start: 1512 Anesthesia Stop: 1532    Procedure: ESOPHAGOGASTRODUODENOSCOPY with biopsies (Esophagus) Diagnosis:       Esophagitis      Gastritis      (Nausea and vomiting, unspecified vomiting type [R11.2])    Surgeons: Rusty Baker MD Provider: Nate Thompson MD    Anesthesia Type: MAC ASA Status: 2            Anesthesia Type: MAC    Vitals  Vitals Value Taken Time   BP 97/57 08/18/23 1531   Temp     Pulse 77 08/18/23 1531   Resp 20 08/18/23 1531   SpO2 95 % 08/18/23 1531           Post Anesthesia Care and Evaluation    Patient location during evaluation: bedside  Patient participation: complete - patient participated  Level of consciousness: awake and alert  Pain management: adequate    Airway patency: patent  Anesthetic complications: No anesthetic complications  PONV Status: controlled  Cardiovascular status: blood pressure returned to baseline and acceptable  Respiratory status: acceptable  Hydration status: acceptable

## 2023-08-18 NOTE — ANESTHESIA PREPROCEDURE EVALUATION
Anesthesia Evaluation     Patient summary reviewed and Nursing notes reviewed   no history of anesthetic complications:   NPO Solid Status: > 8 hours  NPO Liquid Status: > 2 hours           Airway   Dental      Pulmonary    Cardiovascular     (+) valvular problems/murmurs      Neuro/Psych    ROS Comment: Mental Impairment  GI/Hepatic/Renal/Endo      Musculoskeletal     Abdominal    Substance History      OB/GYN          Other                        Anesthesia Plan    ASA 2     MAC     intravenous induction     Anesthetic plan, risks, benefits, and alternatives have been provided, discussed and informed consent has been obtained with: patient.      CODE STATUS:

## 2023-08-18 NOTE — H&P
[facility]  Pre Procedure History & Physical    Chief Complaint:   Nausea with vomiting    Subjective     HPI:   This 33-year-old male presents the endoscopy suite for upper endoscopic evaluation.  He has issues with nausea and vomiting especially after cough.    Past Medical History:   Past Medical History:   Diagnosis Date    Allergic     Incontinent of feces     Low back pain     Mental impairment     Tethered cord        Past Surgical History:  Past Surgical History:   Procedure Laterality Date    BACK SURGERY      COLONOSCOPY  2003    COLONOSCOPY N/A 7/5/2023    Procedure: COLONOSCOPY TO TRANSVERSE WITH DISIMPACTION;  Surgeon: Rusty Baker MD;  Location: [facility] ENDOSCOPY;  Service: Gastroenterology;  Laterality: N/A;  FECAL IMPACTION, CONSTIPATION       Family History:  Family History   Family history unknown: Yes       Social History:   reports that he has never smoked. He has never used smokeless tobacco. He reports that he does not drink alcohol and does not use drugs.    Medications:   No medications prior to admission.       Allergies:  Patient has no known allergies.    ROS:    Pertinent items are noted in HPI     Objective     There were no vitals taken for this visit.    Physical Exam   Constitutional: Pt is oriented to person, place, and time and well-developed, well-nourished, and in no distress.   Mouth/Throat: Oropharynx is clear and moist.   Neck: Normal range of motion.   Cardiovascular: Normal rate, regular rhythm and normal heart sounds.    Pulmonary/Chest: Effort normal and breath sounds normal.   Abdominal: Soft. Nontender  Skin: Skin is warm and dry.   Psychiatric: Mood, memory, affect and judgment normal.     Assessment & Plan     Diagnosis:  Nausea with vomiting    Anticipated Surgical Procedure:  EGD    The risks, benefits, and alternatives of this procedure have been discussed with the patient or the responsible party- the patient understands and agrees  to proceed.

## 2023-08-20 LAB — UREASE TISS QL: NEGATIVE

## 2023-08-21 LAB
LAB AP CASE REPORT: NORMAL
PATH REPORT.FINAL DX SPEC: NORMAL
PATH REPORT.GROSS SPEC: NORMAL

## 2023-08-22 ENCOUNTER — TELEPHONE (OUTPATIENT)
Dept: GASTROENTEROLOGY | Facility: CLINIC | Age: 33
End: 2023-08-22
Payer: MEDICAID

## 2023-08-22 NOTE — TELEPHONE ENCOUNTER
----- Message from Rusty RIVERA MD sent at 8/21/2023  3:12 PM EDT -----  Regarding: Biopsy results  Okay to call results, if still having symptoms would initiate a proton pump inhibitor.  ----- Message -----  From: Lab, Background User  Sent: 8/19/2023  11:38 AM EDT  To: Rusty RIVERA MD

## 2023-08-22 NOTE — TELEPHONE ENCOUNTER
Called pt's caregiver and advised of path report and Dr Baker's note.  She verbalized understanding.     Per caregiver pt is currently asymptomatic, advised to call back if symptoms return.     Also, per request of caregiver, copy faxed to [facility] .

## 2023-10-03 ENCOUNTER — TELEPHONE (OUTPATIENT)
Dept: GASTROENTEROLOGY | Facility: CLINIC | Age: 33
End: 2023-10-03

## 2023-10-04 ENCOUNTER — LAB (OUTPATIENT)
Dept: LAB | Facility: HOSPITAL | Age: 33
End: 2023-10-04
Payer: MEDICAID

## 2023-10-04 ENCOUNTER — OFFICE VISIT (OUTPATIENT)
Dept: FAMILY MEDICINE CLINIC | Age: 33
End: 2023-10-04
Payer: MEDICAID

## 2023-10-04 VITALS
SYSTOLIC BLOOD PRESSURE: 115 MMHG | HEART RATE: 72 BPM | TEMPERATURE: 97 F | BODY MASS INDEX: 20.52 KG/M2 | OXYGEN SATURATION: 100 % | WEIGHT: 120.2 LBS | HEIGHT: 64 IN | DIASTOLIC BLOOD PRESSURE: 72 MMHG

## 2023-10-04 DIAGNOSIS — R19.7 DIARRHEA, UNSPECIFIED TYPE: ICD-10-CM

## 2023-10-04 DIAGNOSIS — R11.0 NAUSEA: Primary | ICD-10-CM

## 2023-10-04 DIAGNOSIS — E87.6 HYPOKALEMIA: ICD-10-CM

## 2023-10-04 DIAGNOSIS — R11.0 NAUSEA: ICD-10-CM

## 2023-10-04 DIAGNOSIS — F41.9 ANXIETY: ICD-10-CM

## 2023-10-04 DIAGNOSIS — F79 MENTAL IMPAIRMENT: ICD-10-CM

## 2023-10-04 LAB
ANION GAP SERPL CALCULATED.3IONS-SCNC: 10.8 MMOL/L (ref 5–15)
BASOPHILS # BLD AUTO: 0.03 10*3/MM3 (ref 0–0.2)
BASOPHILS NFR BLD AUTO: 0.5 % (ref 0–1.5)
BUN SERPL-MCNC: 9 MG/DL (ref 6–20)
BUN/CREAT SERPL: 9.5 (ref 7–25)
CALCIUM SPEC-SCNC: 9.5 MG/DL (ref 8.6–10.5)
CHLORIDE SERPL-SCNC: 104 MMOL/L (ref 98–107)
CO2 SERPL-SCNC: 25.2 MMOL/L (ref 22–29)
CREAT SERPL-MCNC: 0.95 MG/DL (ref 0.76–1.27)
DEPRECATED RDW RBC AUTO: 41.2 FL (ref 37–54)
EGFRCR SERPLBLD CKD-EPI 2021: 109.1 ML/MIN/1.73
EOSINOPHIL # BLD AUTO: 0.03 10*3/MM3 (ref 0–0.4)
EOSINOPHIL NFR BLD AUTO: 0.5 % (ref 0.3–6.2)
ERYTHROCYTE [DISTWIDTH] IN BLOOD BY AUTOMATED COUNT: 12.8 % (ref 12.3–15.4)
EXPIRATION DATE: NORMAL
FLUAV AG UPPER RESP QL IA.RAPID: NOT DETECTED
FLUBV AG UPPER RESP QL IA.RAPID: NOT DETECTED
GLUCOSE SERPL-MCNC: 73 MG/DL (ref 65–99)
HCT VFR BLD AUTO: 46.1 % (ref 37.5–51)
HGB BLD-MCNC: 15.2 G/DL (ref 13–17.7)
IMM GRANULOCYTES # BLD AUTO: 0.01 10*3/MM3 (ref 0–0.05)
IMM GRANULOCYTES NFR BLD AUTO: 0.2 % (ref 0–0.5)
INTERNAL CONTROL: NORMAL
LYMPHOCYTES # BLD AUTO: 1.46 10*3/MM3 (ref 0.7–3.1)
LYMPHOCYTES NFR BLD AUTO: 24.9 % (ref 19.6–45.3)
Lab: NORMAL
MCH RBC QN AUTO: 28.7 PG (ref 26.6–33)
MCHC RBC AUTO-ENTMCNC: 33 G/DL (ref 31.5–35.7)
MCV RBC AUTO: 87 FL (ref 79–97)
MONOCYTES # BLD AUTO: 0.74 10*3/MM3 (ref 0.1–0.9)
MONOCYTES NFR BLD AUTO: 12.6 % (ref 5–12)
NEUTROPHILS NFR BLD AUTO: 3.59 10*3/MM3 (ref 1.7–7)
NEUTROPHILS NFR BLD AUTO: 61.3 % (ref 42.7–76)
PLATELET # BLD AUTO: 267 10*3/MM3 (ref 140–450)
PMV BLD AUTO: 8 FL (ref 6–12)
POTASSIUM SERPL-SCNC: 4 MMOL/L (ref 3.5–5.2)
RBC # BLD AUTO: 5.3 10*6/MM3 (ref 4.14–5.8)
SARS-COV-2 AG UPPER RESP QL IA.RAPID: NOT DETECTED
SODIUM SERPL-SCNC: 140 MMOL/L (ref 136–145)
WBC NRBC COR # BLD: 5.86 10*3/MM3 (ref 3.4–10.8)

## 2023-10-04 PROCEDURE — 87428 SARSCOV & INF VIR A&B AG IA: CPT

## 2023-10-04 PROCEDURE — 36415 COLL VENOUS BLD VENIPUNCTURE: CPT

## 2023-10-04 PROCEDURE — 80048 BASIC METABOLIC PNL TOTAL CA: CPT

## 2023-10-04 PROCEDURE — 1160F RVW MEDS BY RX/DR IN RCRD: CPT

## 2023-10-04 PROCEDURE — 1159F MED LIST DOCD IN RCRD: CPT

## 2023-10-04 PROCEDURE — 85025 COMPLETE CBC W/AUTO DIFF WBC: CPT

## 2023-10-04 PROCEDURE — 99214 OFFICE O/P EST MOD 30 MIN: CPT

## 2023-10-04 RX ORDER — POLYETHYLENE GLYCOL 3350 17 G/17G
POWDER, FOR SOLUTION ORAL
Qty: 30 PACKET | Refills: 5 | Status: SHIPPED | OUTPATIENT
Start: 2023-10-04

## 2023-10-04 RX ORDER — METRONIDAZOLE 500 MG/1
500 TABLET ORAL EVERY 6 HOURS
COMMUNITY

## 2023-10-04 RX ORDER — CIPROFLOXACIN 500 MG/1
500 TABLET, FILM COATED ORAL 2 TIMES DAILY
COMMUNITY

## 2023-10-04 NOTE — PROGRESS NOTES
"Subjective     CHIEF COMPLAINT    Chief Complaint   Patient presents with    Nausea     History of Present Illness  Patient is a 33-year-old male who presents to the clinic today with his caregiver Georgette.  They are here today because patient attends a day program and his caregiver states that she received a call that they told her that the patient \"did not look well.\"  They told her that he had a \"low-grade temp.\"  There were no specifics as to what his temperature was.  Patient was recently seen on 10-1-2023 at [facility] for vomiting and diarrhea.  He had an abdominal x-ray which showed gastroenteritis with possible sigmoid volvulus or distal colonic obstruction.  Then he had a CT completed at the ER which showed no acute intra-abdominal or pelvic findings.  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  This was actually noted to be improved from the previous CT scan they had done in June 2023.  He was discharged from the hospital on Cipro and metronidazole.  He is taking these medications and tolerating well.  Per his caregiver he has not had any nausea or vomiting and he has had no episodes of diarrhea.  His last bowel movement was yesterday.  She does state that he has a lot of anxiety which sometimes causes some of his issues.  Patient does have a mental impairment which makes some of his history taking difficult.  He follows with GI for chronic constipation issues.    Review of Systems   Constitutional:  Positive for fever (\"Low-grade\" per day program.).   Respiratory:  Negative for cough, shortness of breath and wheezing.    Cardiovascular:  Negative for chest pain.   Gastrointestinal:  Negative for abdominal pain, blood in stool, constipation, diarrhea, nausea and vomiting.     No Known Allergies    Current Outpatient Medications on File Prior to Visit   Medication Sig Dispense Refill    CBD (cannabidiol) oral oil Take  by mouth 2 (Two) Times a Day.      ciprofloxacin (CIPRO) 500 MG tablet Take 1 " "tablet by mouth 2 (Two) Times a Day.      fluticasone (Flonase) 50 MCG/ACT nasal spray 2 sprays into the nostril(s) as directed by provider Daily. 15.8 mL 11    hydrOXYzine (ATARAX) 50 MG tablet Take 1 tablet by mouth 2 (Two) Times a Day. 180 tablet 1    lactulose (CHRONULAC) 10 GM/15ML solution Take 30 mL by mouth 2 (Two) Times a Day As Needed (Constipation). 150 mL 2    metroNIDAZOLE (FLAGYL) 500 MG tablet Take 1 tablet by mouth Every 6 (Six) Hours.      montelukast (SINGULAIR) 5 MG chewable tablet Chew 2 tablets Every Night. 180 tablet 3    Multiple Vitamins-Minerals (Multivitamin Gummies Mens) chewable tablet Chew 1 each Daily. 90 tablet 3    ondansetron (ZOFRAN) 4 MG tablet Take 1 tablet by mouth Every 8 (Eight) Hours As Needed for Nausea or Vomiting.      polyethylene glycol (HealthyLax) 17 g packet Take 17 g by mouth Daily. 30 packet 5    sertraline (Zoloft) 100 MG tablet Take 1 tablet by mouth Daily. 180 tablet 1    traZODone (DESYREL) 50 MG tablet Take 1 tablet by mouth Every Night. 90 tablet 1    Unable to find 1 each 1 (One) Time. Med Name: Boy Vitamins      Vitamins A & D (vitamin A & D) ointment Apply 1 application topically to the appropriate area as directed As Needed (skin irritation). 113 g 11    [DISCONTINUED] polyethylene glycol (MIRALAX) 17 g packet Take 17 g by mouth Daily. 30 packet 3     No current facility-administered medications on file prior to visit.     /72 (BP Location: Left arm, Patient Position: Sitting, Cuff Size: Adult)   Pulse 72   Temp 97 °F (36.1 °C) (Axillary)   Ht 162.6 cm (64.02\")   Wt 54.5 kg (120 lb 3.2 oz)   SpO2 100%   BMI 20.62 kg/m²     Objective     Physical Exam  Vitals and nursing note reviewed.   Constitutional:       General: He is not in acute distress.     Appearance: Normal appearance. He is not ill-appearing.   HENT:      Head: Normocephalic.      Mouth/Throat:      Lips: Pink.   Cardiovascular:      Rate and Rhythm: Normal rate and regular rhythm. "      Heart sounds: Normal heart sounds. No murmur heard.  Pulmonary:      Effort: Pulmonary effort is normal. No accessory muscle usage or respiratory distress.      Breath sounds: Normal breath sounds. No wheezing or rhonchi.   Abdominal:      General: Bowel sounds are normal. There is no distension.      Palpations: Abdomen is soft.      Tenderness: There is no abdominal tenderness. There is no right CVA tenderness, left CVA tenderness, guarding or rebound.   Musculoskeletal:      Cervical back: Normal range of motion. No rigidity. Normal range of motion.   Skin:     General: Skin is warm and dry.   Neurological:      Mental Status: He is alert. Mental status is at baseline.   Psychiatric:         Mood and Affect: Mood normal.         Behavior: Behavior normal.        Lab Results (last 24 hours)       Procedure Component Value Units Date/Time    POCT SARS-CoV-2 Antigen PAWEL + Flu [[id number]] Collected: 10/04/23 1033    Specimen: Swab Updated: 10/04/23 1033     SARS Antigen Not Detected     Influenza A Antigen PAWEL Not Detected     Influenza B Antigen PAWEL Not Detected     Internal Control Passed     Lot Number [id number]     Expiration Date 08/07/24    CBC w AUTO Differential [[id number]]  (Abnormal) Collected: 10/04/23 1115    Specimen: Blood Updated: 10/04/23 1123    Narrative:      The following orders were created for panel order CBC w AUTO Differential.  Procedure                               Abnormality         Status                     ---------                               -----------         ------                     CBC Auto Differential[[id number]]        Abnormal            Final result                 Please view results for these tests on the individual orders.    Basic metabolic panel [[id number]] Collected: 10/04/23 1115    Specimen: Blood Updated: 10/04/23 1115    CBC Auto Differential [[id number]]  (Abnormal) Collected: 10/04/23 1115    Specimen: Blood Updated: 10/04/23 1123     WBC 5.86 10*3/mm3       RBC 5.30 10*6/mm3      Hemoglobin 15.2 g/dL      Hematocrit 46.1 %      MCV 87.0 fL      MCH 28.7 pg      MCHC 33.0 g/dL      RDW 12.8 %      RDW-SD 41.2 fl      MPV 8.0 fL      Platelets 267 10*3/mm3      Neutrophil % 61.3 %      Lymphocyte % 24.9 %      Monocyte % 12.6 %      Eosinophil % 0.5 %      Basophil % 0.5 %      Immature Grans % 0.2 %      Neutrophils, Absolute 3.59 10*3/mm3      Lymphocytes, Absolute 1.46 10*3/mm3      Monocytes, Absolute 0.74 10*3/mm3      Eosinophils, Absolute 0.03 10*3/mm3      Basophils, Absolute 0.03 10*3/mm3      Immature Grans, Absolute 0.01 10*3/mm3           The following data was reviewed for today's visit:  SCANNED - IMAGING (10/01/2023)    SCANNED - IMAGING (10/01/2023)    SCANNED - LABS (10/01/2023)         Diagnoses and all orders for this visit:    1. Nausea (Primary)  -     POCT SARS-CoV-2 Antigen PAWEL + Flu  -     CBC w AUTO Differential; Future    2. Diarrhea, unspecified type    3. Hypokalemia  -     Basic metabolic panel; Future    4. Mental impairment    5. Anxiety  Comments:  We will schedule patient an appointment with his PCP to further discuss management of anxiety.  Caregiver has also contacted Lashonda.    There are no concerning findings on patient's physical exam today.  His abdomen is nontender to palpation.  His vital signs are stable.  As a precaution, checked patient for COVID and flu which was negative today.  He does not have a fever in office.  He is not ill-appearing.  I reviewed his CT, abdominal x-ray and labs from [facility] on 10-1-2023.  There was no acute findings noted on his CT scan.  He is no longer having any vomiting or diarrhea.  At this time, recommend he continue with the ciprofloxacin and metronidazole as prescribed per the ER.  I did recommend that his caregiver follow-up with his gastroenterologist regarding the recent ER visit.  She states that she will call them. we will check a CBC today as a precaution.  He was also noted to  have a potassium level of 3.2 on his labs from the ER so we will recheck a BMP today.  Provided a note stating that I felt patient is able to to return to his day program today.  If any of his symptoms worsen or do not improve, he should return to clinic or proceed to the ER.  His caregiver voiced understanding.    36 minutes were spent in this encounter including face-to-face care, chart review, coordination of care and documentation.    Follow up:  Return if symptoms worsen or fail to improve, for Next scheduled follow up.  Patient was given instructions and counseling regarding his condition or for health maintenance advice. Please see specific information pulled into the AVS if appropriate.

## 2023-11-06 ENCOUNTER — OFFICE VISIT (OUTPATIENT)
Dept: FAMILY MEDICINE CLINIC | Age: 33
End: 2023-11-06
Payer: MEDICAID

## 2023-11-06 VITALS
SYSTOLIC BLOOD PRESSURE: 108 MMHG | DIASTOLIC BLOOD PRESSURE: 72 MMHG | WEIGHT: 126.4 LBS | HEART RATE: 71 BPM | TEMPERATURE: 97.8 F | OXYGEN SATURATION: 99 % | HEIGHT: 64 IN | BODY MASS INDEX: 21.58 KG/M2

## 2023-11-06 DIAGNOSIS — Z23 IMMUNIZATION DUE: Primary | ICD-10-CM

## 2023-11-06 DIAGNOSIS — R45.1 AGITATION: ICD-10-CM

## 2023-11-06 DIAGNOSIS — F41.9 ANXIETY: ICD-10-CM

## 2023-11-06 RX ORDER — TRAZODONE HYDROCHLORIDE 100 MG/1
100 TABLET ORAL NIGHTLY
Qty: 90 TABLET | Refills: 1 | Status: SHIPPED | OUTPATIENT
Start: 2023-11-06

## 2023-11-06 RX ORDER — SERTRALINE HYDROCHLORIDE 100 MG/1
100 TABLET, FILM COATED ORAL DAILY
COMMUNITY

## 2023-11-06 RX ORDER — HYDROXYZINE 50 MG/1
100 TABLET, FILM COATED ORAL 2 TIMES DAILY
Qty: 360 TABLET | Refills: 1 | Status: SHIPPED | OUTPATIENT
Start: 2023-11-06

## 2023-11-06 NOTE — PROGRESS NOTES
"Chief Complaint  Anxiety    Subjective          Rakesh Mitchell presents to [facility] for increased anxiety.  Patient is currently taking Zoloft 100 mg daily and trazodone 50 mg every night.  He has hydroxyzine 50 mg twice daily.  Caregiver is here with patient and she states that there has been staff changes at  which has flared his anxiety.  He was recently started on BuSpar by [facility] and this did not help and seem to actually make his anxiety worse.  She is wishing to stop going to [facility].  Patient states that he is not sleeping well.    Objective   Vital Signs:   Vitals:    11/06/23 1103   BP: 108/72   BP Location: Left arm   Patient Position: Sitting   Cuff Size: Adult   Pulse: 71   Temp: 97.8 °F (36.6 °C)   TempSrc: Oral   SpO2: 99%   Weight: 57.3 kg (126 lb 6.4 oz)   Height: 162.6 cm (64.02\")       Physical Exam  Vitals reviewed.   Constitutional:       General: He is not in acute distress.     Appearance: Normal appearance. He is well-developed.   HENT:      Head: Normocephalic and atraumatic.   Eyes:      Conjunctiva/sclera: Conjunctivae normal.      Pupils: Pupils are equal, round, and reactive to light.   Cardiovascular:      Rate and Rhythm: Normal rate and regular rhythm.      Heart sounds: Normal heart sounds. No murmur heard.  Pulmonary:      Effort: Pulmonary effort is normal. No respiratory distress.      Breath sounds: Normal breath sounds.   Skin:     General: Skin is warm and dry.   Neurological:      Mental Status: He is alert. Mental status is at baseline.   Psychiatric:         Mood and Affect: Mood and affect normal.         Behavior: Behavior normal.         Thought Content: Thought content normal.         Judgment: Judgment normal.          Result Review :              Assessment and Plan    Diagnoses and all orders for this visit:    1. Immunization due (Primary)  -     Fluzone >6 Months ([id number])    2. Anxiety  Comments:  Continue Zoloft 100 mg " daily.  Increasing hydroxyzine to 100 mg twice daily.  Increasing trazodone from 50 mg to 100 mg every night.  Orders:  -     hydrOXYzine (ATARAX) 50 MG tablet; Take 2 tablets by mouth 2 (Two) Times a Day.  Dispense: 360 tablet; Refill: 1    3. Agitation  Comments:  Continue Zoloft 100 mg daily.  Increasing hydroxyzine to 100 mg twice daily.  Increasing trazodone from 50 mg to 100 mg every night.  Orders:  -     hydrOXYzine (ATARAX) 50 MG tablet; Take 2 tablets by mouth 2 (Two) Times a Day.  Dispense: 360 tablet; Refill: 1    Other orders  -     traZODone (DESYREL) 100 MG tablet; Take 1 tablet by mouth Every Night.  Dispense: 90 tablet; Refill: 1    Refills provided for CBD oil and all the vitamin Gummies.    Caregiver to notify office with any acute concerns or issues.  Caregiver verbalizes understanding, agrees with plan of care and has no further questions upon discharge.    Please note that portions of this note were completed with a voice recognition program.    Follow Up    Return in about 6 months (around 5/6/2024).  Patient was given instructions and counseling regarding his condition or for health maintenance advice. Please see specific information pulled into the AVS if appropriate.

## 2023-11-28 DIAGNOSIS — J30.2 SEASONAL ALLERGIES: ICD-10-CM

## 2023-11-29 DIAGNOSIS — J30.2 SEASONAL ALLERGIES: ICD-10-CM

## 2023-11-29 DIAGNOSIS — F41.9 ANXIETY: ICD-10-CM

## 2023-11-29 DIAGNOSIS — R45.1 AGITATION: ICD-10-CM

## 2023-11-29 RX ORDER — HYDROXYZINE 50 MG/1
100 TABLET, FILM COATED ORAL 2 TIMES DAILY
Qty: 360 TABLET | Refills: 1 | Status: SHIPPED | OUTPATIENT
Start: 2023-11-29

## 2023-11-29 RX ORDER — MONTELUKAST SODIUM 5 MG/1
TABLET, CHEWABLE ORAL
Qty: 180 TABLET | Refills: 1 | OUTPATIENT
Start: 2023-11-29

## 2023-11-29 RX ORDER — HYDROXYZINE PAMOATE 100 MG
100 CAPSULE ORAL 3 TIMES DAILY PRN
OUTPATIENT
Start: 2023-11-29

## 2023-11-29 RX ORDER — MONTELUKAST SODIUM 5 MG/1
10 TABLET, CHEWABLE ORAL NIGHTLY
Qty: 180 TABLET | Refills: 3 | Status: SHIPPED | OUTPATIENT
Start: 2023-11-29

## 2023-12-07 ENCOUNTER — TELEPHONE (OUTPATIENT)
Dept: FAMILY MEDICINE CLINIC | Age: 33
End: 2023-12-07

## 2023-12-07 DIAGNOSIS — F41.9 ANXIETY: ICD-10-CM

## 2023-12-07 DIAGNOSIS — Z00.00 ROUTINE GENERAL MEDICAL EXAMINATION AT A HEALTH CARE FACILITY: ICD-10-CM

## 2023-12-07 DIAGNOSIS — R45.1 AGITATION: ICD-10-CM

## 2023-12-07 DIAGNOSIS — J30.2 SEASONAL ALLERGIES: ICD-10-CM

## 2023-12-07 RX ORDER — SERTRALINE HYDROCHLORIDE 100 MG/1
100 TABLET, FILM COATED ORAL DAILY
Qty: 90 TABLET | Refills: 3 | Status: SHIPPED | OUTPATIENT
Start: 2023-12-07

## 2023-12-07 RX ORDER — TRAZODONE HYDROCHLORIDE 100 MG/1
100 TABLET ORAL NIGHTLY
Qty: 90 TABLET | Refills: 3 | Status: SHIPPED | OUTPATIENT
Start: 2023-12-07

## 2023-12-07 RX ORDER — FLUTICASONE PROPIONATE 50 MCG
2 SPRAY, SUSPENSION (ML) NASAL DAILY
Qty: 15.8 ML | Refills: 11 | Status: SHIPPED | OUTPATIENT
Start: 2023-12-07

## 2023-12-07 RX ORDER — CALCIUM CITRATE/VITAMIN D3 200MG-6.25
1 TABLET ORAL DAILY
Qty: 90 TABLET | Refills: 3 | Status: SHIPPED | OUTPATIENT
Start: 2023-12-07

## 2023-12-07 RX ORDER — HYDROXYZINE 50 MG/1
100 TABLET, FILM COATED ORAL 2 TIMES DAILY
Qty: 360 TABLET | Refills: 3 | Status: SHIPPED | OUTPATIENT
Start: 2023-12-07

## 2023-12-07 RX ORDER — MONTELUKAST SODIUM 5 MG/1
10 TABLET, CHEWABLE ORAL NIGHTLY
Qty: 180 TABLET | Refills: 3 | Status: SHIPPED | OUTPATIENT
Start: 2023-12-07

## 2023-12-07 NOTE — TELEPHONE ENCOUNTER
With increased dose of hydroxyzine, he would not be able to take loratadine. They are both allergy medications.

## 2023-12-07 NOTE — TELEPHONE ENCOUNTER
Caller: MARLO CHUNG    Relationship: Emergency Contact    Best call back number: [phone]     What medication are you requesting: CLARITIN    What are your current symptoms: ALLERGIES      If a prescription is needed, what is your preferred pharmacy and phone number: [place], [place] - [address] [place] [address] RD - [phone]  - [phone] FX     Additional notes:      MARLO STATES THE PATIENT WAS ON THIS MEDICATION PREVIOUSLY AND HE NEEDS IT AGAIN.

## 2023-12-07 NOTE — TELEPHONE ENCOUNTER
Caller: MARLO CHUNG    Relationship: Emergency Contact    Best call back number:   [phone]    Requested Prescriptions:   Requested Prescriptions     Pending Prescriptions Disp Refills    Multiple Vitamins-Minerals (Multivitamin Gummies Mens) chewable tablet 90 tablet 3     Sig: Chew 1 each Daily.    fluticasone (Flonase) 50 MCG/ACT nasal spray 15.8 mL 11     Si sprays into the nostril(s) as directed by provider Daily.    montelukast (SINGULAIR) 5 MG chewable tablet 180 tablet 3     Sig: Chew 2 tablets Every Night.    sertraline (ZOLOFT) 100 MG tablet       Sig: Take 1 tablet by mouth Daily.    traZODone (DESYREL) 100 MG tablet 90 tablet 1     Sig: Take 1 tablet by mouth Every Night.    hydrOXYzine (ATARAX) 50 MG tablet 360 tablet 1     Sig: Take 2 tablets by mouth 2 (Two) Times a Day.    CBD (cannabidiol) oral oil       Sig: Take  by mouth 2 (Two) Times a Day.     HARESH TAMEZ STRESS GUMMIES (OTC) BUT NEEDS  PRESCRIPTION    Pharmacy where request should be sent: [address] - [phone]  - [phone] FX     Last office visit with prescribing clinician: 2023   Last telemedicine visit with prescribing clinician: Visit date not found   Next office visit with prescribing clinician: 2023     Additional details provided by patient: MARLO STATES THE PATIENT NEEDS A YEAR SUPPLY OF THE REQUESTED MEDICATIONS.    Does the patient have less than a 3 day supply:  [x] Yes  [] No    Olga Lidia Miller Rep   23 09:54 EST

## 2023-12-11 RX ORDER — SERTRALINE HYDROCHLORIDE 100 MG/1
100 TABLET, FILM COATED ORAL DAILY
Qty: 90 TABLET | Refills: 3 | Status: CANCELLED | OUTPATIENT
Start: 2023-12-11

## 2023-12-11 NOTE — TELEPHONE ENCOUNTER
Caller: MARLO CHUNG    Relationship: Emergency Contact    Best call back number: [phone]    Requested Prescriptions:   Requested Prescriptions     Pending Prescriptions Disp Refills    sertraline (ZOLOFT) 100 MG tablet 90 tablet 3     Sig: Take 1 tablet by mouth Daily.        Pharmacy where request should be sent: [facility] [address] - [phone] PH - [phone] FX     Last office visit with prescribing clinician: 11/6/2023   Last telemedicine visit with prescribing clinician: Visit date not found   Next office visit with prescribing clinician: 12/18/2023     Additional details provided by patient:     Does the patient have less than a 3 day supply:  [x] Yes  [] No    Would you like a call back once the refill request has been completed: [] Yes [] No    If the office needs to give you a call back, can they leave a voicemail: [] Yes [] No    Olga Lidia Wren Rep   12/11/23 13:27 EST

## 2023-12-18 ENCOUNTER — OFFICE VISIT (OUTPATIENT)
Dept: FAMILY MEDICINE CLINIC | Age: 33
End: 2023-12-18
Payer: MEDICAID

## 2023-12-18 ENCOUNTER — LAB (OUTPATIENT)
Dept: LAB | Facility: HOSPITAL | Age: 33
End: 2023-12-18
Payer: MEDICAID

## 2023-12-18 VITALS
DIASTOLIC BLOOD PRESSURE: 66 MMHG | TEMPERATURE: 97.5 F | HEIGHT: 64 IN | SYSTOLIC BLOOD PRESSURE: 109 MMHG | WEIGHT: 125.8 LBS | BODY MASS INDEX: 21.48 KG/M2 | HEART RATE: 76 BPM

## 2023-12-18 DIAGNOSIS — Z00.00 ROUTINE GENERAL MEDICAL EXAMINATION AT A HEALTH CARE FACILITY: Primary | ICD-10-CM

## 2023-12-18 DIAGNOSIS — Z00.00 ROUTINE GENERAL MEDICAL EXAMINATION AT A HEALTH CARE FACILITY: ICD-10-CM

## 2023-12-18 DIAGNOSIS — K59.00 CONSTIPATION, UNSPECIFIED CONSTIPATION TYPE: ICD-10-CM

## 2023-12-18 DIAGNOSIS — H61.23 BILATERAL IMPACTED CERUMEN: ICD-10-CM

## 2023-12-18 LAB
ALBUMIN SERPL-MCNC: 4.9 G/DL (ref 3.5–5.2)
ALBUMIN/GLOB SERPL: 1.9 G/DL
ALP SERPL-CCNC: 74 U/L (ref 39–117)
ALT SERPL W P-5'-P-CCNC: 22 U/L (ref 1–41)
ANION GAP SERPL CALCULATED.3IONS-SCNC: 9.8 MMOL/L (ref 5–15)
AST SERPL-CCNC: 23 U/L (ref 1–40)
BILIRUB SERPL-MCNC: 0.3 MG/DL (ref 0–1.2)
BUN SERPL-MCNC: 12 MG/DL (ref 6–20)
BUN/CREAT SERPL: 12.2 (ref 7–25)
CALCIUM SPEC-SCNC: 9.8 MG/DL (ref 8.6–10.5)
CHLORIDE SERPL-SCNC: 104 MMOL/L (ref 98–107)
CHOLEST SERPL-MCNC: 229 MG/DL (ref 0–200)
CO2 SERPL-SCNC: 25.2 MMOL/L (ref 22–29)
CREAT SERPL-MCNC: 0.98 MG/DL (ref 0.76–1.27)
EGFRCR SERPLBLD CKD-EPI 2021: 105.1 ML/MIN/1.73
GLOBULIN UR ELPH-MCNC: 2.6 GM/DL
GLUCOSE SERPL-MCNC: 83 MG/DL (ref 65–99)
HDLC SERPL-MCNC: 37 MG/DL (ref 40–60)
LDLC SERPL CALC-MCNC: 150 MG/DL (ref 0–100)
LDLC/HDLC SERPL: 3.94 {RATIO}
POTASSIUM SERPL-SCNC: 4.6 MMOL/L (ref 3.5–5.2)
PROT SERPL-MCNC: 7.5 G/DL (ref 6–8.5)
SODIUM SERPL-SCNC: 139 MMOL/L (ref 136–145)
TRIGL SERPL-MCNC: 231 MG/DL (ref 0–150)
VLDLC SERPL-MCNC: 42 MG/DL (ref 5–40)

## 2023-12-18 PROCEDURE — 36415 COLL VENOUS BLD VENIPUNCTURE: CPT

## 2023-12-18 PROCEDURE — 80053 COMPREHEN METABOLIC PANEL: CPT

## 2023-12-18 PROCEDURE — 80061 LIPID PANEL: CPT

## 2023-12-18 RX ORDER — DOCUSATE SODIUM 100 MG/1
100 CAPSULE, LIQUID FILLED ORAL NIGHTLY
COMMUNITY
Start: 2023-11-30 | End: 2023-12-18 | Stop reason: SDUPTHER

## 2023-12-18 RX ORDER — DOCUSATE SODIUM 100 MG/1
100 CAPSULE, LIQUID FILLED ORAL NIGHTLY
Qty: 90 CAPSULE | Refills: 3 | Status: SHIPPED | OUTPATIENT
Start: 2023-12-18

## 2023-12-18 NOTE — PROGRESS NOTES
"Chief Complaint  Annual Exam    Subjective          Rakesh Mitchell presents to [facility] for annual exam. Caregiver is here with pt. Neither have concerns today. Has stopped going to [facility]. Goes to dental and eye exams routinely. Declines COVID vaccine. UTD on other vaccinations. Constipation is under control.     Review of Systems   Constitutional:  Negative for fatigue.   HENT:  Negative for hearing loss and trouble swallowing.    Eyes:  Negative for blurred vision.   Respiratory:  Negative for cough and shortness of breath.    Cardiovascular:  Negative for chest pain, palpitations and leg swelling.   Gastrointestinal:  Negative for abdominal pain, constipation, diarrhea, nausea and vomiting.   Genitourinary:  Negative for dysuria, frequency and urgency.   Musculoskeletal:  Negative for myalgias.   Skin:  Negative for color change and rash.   Neurological:  Negative for dizziness, weakness and headache.   Psychiatric/Behavioral:  Negative for sleep disturbance, suicidal ideas and depressed mood. The patient is not nervous/anxious.          Objective   Vital Signs:   Vitals:    12/18/23 0754   BP: 109/66   BP Location: Left arm   Patient Position: Sitting   Pulse: 76   Temp: 97.5 °F (36.4 °C)   TempSrc: Oral   Weight: 57.1 kg (125 lb 12.8 oz)   Height: 162.6 cm (64.02\")       Physical Exam  Vitals reviewed.   Constitutional:       General: He is not in acute distress.     Appearance: He is not diaphoretic.   HENT:      Head: Normocephalic and atraumatic.      Right Ear: Hearing and external ear normal. There is impacted cerumen.      Left Ear: Hearing and external ear normal. There is impacted cerumen.      Nose: Nose normal. No nasal deformity.      Mouth/Throat:      Mouth: Mucous membranes are moist.   Eyes:      General: Lids are normal. No scleral icterus.        Right eye: No discharge.         Left eye: No discharge.      Extraocular Movements: Extraocular movements " intact.      Right eye: Normal extraocular motion and no nystagmus.      Left eye: Normal extraocular motion and no nystagmus.      Conjunctiva/sclera: Conjunctivae normal.      Pupils: Pupils are equal, round, and reactive to light.   Neck:      Thyroid: No thyromegaly.   Cardiovascular:      Rate and Rhythm: Normal rate and regular rhythm.      Pulses: Normal pulses.      Heart sounds: Normal heart sounds. No murmur heard.     No gallop.   Pulmonary:      Effort: Pulmonary effort is normal. No respiratory distress.      Breath sounds: Normal breath sounds. No wheezing or rales.   Chest:      Chest wall: No tenderness.   Abdominal:      General: Bowel sounds are normal. There is no distension.      Palpations: Abdomen is soft. There is no mass.      Tenderness: There is no abdominal tenderness. There is no guarding.      Hernia: No hernia is present.   Musculoskeletal:         General: No tenderness or deformity. Normal range of motion.      Cervical back: Normal range of motion and neck supple.   Lymphadenopathy:      Cervical: No cervical adenopathy.   Skin:     General: Skin is warm and dry.      Findings: No rash.   Neurological:      General: No focal deficit present.      Mental Status: He is alert. Mental status is at baseline.      Coordination: Coordination normal.   Psychiatric:         Mood and Affect: Mood normal.         Behavior: Behavior normal.         Thought Content: Thought content normal.         Judgment: Judgment normal.          Result Review :              Assessment and Plan    Diagnoses and all orders for this visit:    1. Routine general medical examination at a health care facility (Primary)  Comments:  Counseled on routine dental and eye exams. Declines COVID. UTD on flu vaccines.  Orders:  -     Comprehensive Metabolic Panel; Future  -     Lipid Panel; Future    2. Bilateral impacted cerumen    3. Constipation, unspecified constipation type  Comments:  controlled refill  provided.  Orders:  -     docusate sodium (COLACE) 100 MG capsule; Take 1 capsule by mouth Every Night.  Dispense: 90 capsule; Refill: 3    Patient to notify office with any acute concerns or issues.  Patient verbalizes understanding, agrees with plan of care and has no further questions upon discharge.    Please note that portions of this note were completed with a voice recognition program.    Follow Up    Return in about 1 year (around 12/18/2024) for Annual physical.  Patient was given instructions and counseling regarding his condition or for health maintenance advice. Please see specific information pulled into the AVS if appropriate.

## 2023-12-18 NOTE — PROGRESS NOTES
Ear Cerumen Removal    Date/Time: 12/18/2023 8:48 AM    Performed by: Char Ballesteros RN  Authorized by: Indira Moise APRN    Anesthesia:  Local Anesthetic: none  Location details: left ear and right ear  Patient tolerance: patient tolerated the procedure well with no immediate complications  Procedure type: irrigation   Sedation:  Patient sedated: no

## 2023-12-19 ENCOUNTER — TELEPHONE (OUTPATIENT)
Dept: FAMILY MEDICINE CLINIC | Age: 33
End: 2023-12-19
Payer: MEDICAID

## 2023-12-19 NOTE — TELEPHONE ENCOUNTER
Caller: MARLO CHUNG    Relationship: Emergency Contact    Best call back number: [phone]     What form or medical record are you requesting: OFFICE VISIT SUMMARY    Who is requesting this form or medical record from you: [facility]    How would you like to receive the form or medical records (pick-up, mail, fax): FAX  If fax, what is the fax number: [phone], ATTK[place]IG [place]TM[place]    Timeframe paperwork needed: TODAY    Additional notes: MARLO IS REQUESTING THE OFFICE VISIT SUMMARY FROM YESTERDAY TO BE FAXED TO THE AGENCY. THE AGENCY NEEDS THE OFFICE VISIT SUMMARY TODAY. SHE FORGOT TO GET IT YESTERDAY AT THE VISIT.

## 2024-01-01 NOTE — TELEPHONE ENCOUNTER
Caller: MARLO CHUNG    Relationship: Emergency Contact    Best call back number: [phone]    Requested Prescriptions:   Requested Prescriptions     Pending Prescriptions Disp Refills   • fluticasone (Flonase) 50 MCG/ACT nasal spray 1 g 3     Si sprays into the nostril(s) as directed by provider Daily.        Pharmacy where request should be sent: [id number] - [address] [phone] [facility] [phone] FX     Additional details provided by patient:CARE GIVER IS REQUESTING IF FILLED FOR YEAR. THE PRESCRIPTION HAS  FOR THE STATE     Does the patient have less than a 3 day supply:  [] Yes  [x] No    Olga Lidia Sawant Rep   22 09:00 EST       MARLO CHUNG IS ON THE  VERBAL    colostrum colostrum

## 2024-01-29 ENCOUNTER — TELEPHONE (OUTPATIENT)
Dept: FAMILY MEDICINE CLINIC | Age: 34
End: 2024-01-29
Payer: MEDICAID

## 2024-01-29 NOTE — TELEPHONE ENCOUNTER
Caller: MARLO CHUNG    Relationship: Emergency Contact    Best call back number: [phone]     Requested Prescriptions:   RAFAEL STRESS GUMMY OVER THE COUNTER    Pharmacy where request should be sent:  [facility]     Last office visit with prescribing clinician: 12/18/2023   Last telemedicine visit with prescribing clinician: Visit date not found   Next office visit with prescribing clinician: 12/19/2024       Does the patient have less than a 3 day supply:  [] Yes  [x] No    Would you like a call back once the refill request has been completed: [] Yes [x] No    If the office needs to give you a call back, can they leave a voicemail: [] Yes [x] No    Jennifer Flores, PCT   01/29/24 14:34 EST

## 2024-02-14 ENCOUNTER — OFFICE VISIT (OUTPATIENT)
Dept: FAMILY MEDICINE CLINIC | Age: 34
End: 2024-02-14
Payer: MEDICAID

## 2024-02-14 VITALS
HEART RATE: 75 BPM | OXYGEN SATURATION: 100 % | HEIGHT: 64 IN | SYSTOLIC BLOOD PRESSURE: 122 MMHG | DIASTOLIC BLOOD PRESSURE: 71 MMHG | WEIGHT: 129.6 LBS | BODY MASS INDEX: 22.13 KG/M2 | TEMPERATURE: 98.5 F

## 2024-02-14 DIAGNOSIS — F41.9 ANXIETY: ICD-10-CM

## 2024-02-14 DIAGNOSIS — R30.0 DYSURIA: Primary | ICD-10-CM

## 2024-02-14 LAB
BACTERIA UR QL AUTO: NORMAL /HPF
BILIRUB UR QL STRIP: ABNORMAL
CLARITY UR: CLEAR
COD CRY URNS QL: NORMAL /HPF
COLOR UR: YELLOW
GLUCOSE UR STRIP-MCNC: NEGATIVE MG/DL
HGB UR QL STRIP.AUTO: NEGATIVE
KETONES UR QL STRIP: NEGATIVE
LEUKOCYTE ESTERASE UR QL STRIP.AUTO: NEGATIVE
MUCOUS THREADS URNS QL MICRO: NORMAL /HPF
NITRITE UR QL STRIP: NEGATIVE
PH UR STRIP.AUTO: 5.5 [PH] (ref 5–8)
PROT UR QL STRIP: NEGATIVE
RBC # UR STRIP: NORMAL /HPF
REF LAB TEST METHOD: NORMAL
SP GR UR STRIP: >=1.03 (ref 1–1.03)
SQUAMOUS #/AREA URNS HPF: NORMAL /HPF
UROBILINOGEN UR QL STRIP: ABNORMAL
WBC # UR STRIP: NORMAL /HPF

## 2024-02-14 PROCEDURE — 1159F MED LIST DOCD IN RCRD: CPT | Performed by: NURSE PRACTITIONER

## 2024-02-14 PROCEDURE — 81001 URINALYSIS AUTO W/SCOPE: CPT | Performed by: NURSE PRACTITIONER

## 2024-02-14 PROCEDURE — 1160F RVW MEDS BY RX/DR IN RCRD: CPT | Performed by: NURSE PRACTITIONER

## 2024-02-14 PROCEDURE — 99214 OFFICE O/P EST MOD 30 MIN: CPT | Performed by: NURSE PRACTITIONER

## 2024-02-14 RX ORDER — BUSPIRONE HYDROCHLORIDE 10 MG/1
10 TABLET ORAL 2 TIMES DAILY
Qty: 60 TABLET | Refills: 1 | Status: SHIPPED | OUTPATIENT
Start: 2024-02-14 | End: 2024-02-14 | Stop reason: SDUPTHER

## 2024-02-14 RX ORDER — BUSPIRONE HYDROCHLORIDE 10 MG/1
10 TABLET ORAL 2 TIMES DAILY
Qty: 60 TABLET | Refills: 1 | Status: SHIPPED | OUTPATIENT
Start: 2024-02-14

## 2024-02-26 ENCOUNTER — OFFICE VISIT (OUTPATIENT)
Dept: FAMILY MEDICINE CLINIC | Age: 34
End: 2024-02-26
Payer: MEDICAID

## 2024-02-26 VITALS
TEMPERATURE: 97.5 F | WEIGHT: 127.2 LBS | BODY MASS INDEX: 21.72 KG/M2 | HEIGHT: 64 IN | HEART RATE: 76 BPM | SYSTOLIC BLOOD PRESSURE: 114 MMHG | DIASTOLIC BLOOD PRESSURE: 70 MMHG

## 2024-02-26 DIAGNOSIS — K29.30 CHRONIC SUPERFICIAL GASTRITIS WITHOUT BLEEDING: Primary | ICD-10-CM

## 2024-02-26 DIAGNOSIS — F41.9 ANXIETY: ICD-10-CM

## 2024-02-26 PROCEDURE — 1160F RVW MEDS BY RX/DR IN RCRD: CPT | Performed by: FAMILY MEDICINE

## 2024-02-26 PROCEDURE — 1159F MED LIST DOCD IN RCRD: CPT | Performed by: FAMILY MEDICINE

## 2024-02-26 PROCEDURE — 99214 OFFICE O/P EST MOD 30 MIN: CPT | Performed by: FAMILY MEDICINE

## 2024-02-26 RX ORDER — LORAZEPAM 0.5 MG/1
0.5 TABLET ORAL EVERY 8 HOURS PRN
Qty: 10 TABLET | Refills: 0 | Status: SHIPPED | OUTPATIENT
Start: 2024-02-26

## 2024-02-26 RX ORDER — HYDROXYZINE HYDROCHLORIDE 25 MG/1
25 TABLET, FILM COATED ORAL 2 TIMES DAILY
COMMUNITY
Start: 2024-02-23

## 2024-02-26 RX ORDER — FAMOTIDINE 20 MG/1
20 TABLET, FILM COATED ORAL
Qty: 90 TABLET | Refills: 3 | Status: SHIPPED | OUTPATIENT
Start: 2024-02-26

## 2024-02-26 RX ORDER — MONTELUKAST SODIUM 10 MG/1
10 TABLET ORAL NIGHTLY
COMMUNITY
Start: 2024-02-23

## 2024-02-26 NOTE — PROGRESS NOTES
Rakesh Mitchell presents to [facility].    Chief Complaint:  Follow up on anxiety, vomiting, gastritis    Subjective   History of Present Illness:  Rakesh is being seen today for follow-up on his care.  He has significant underlying anxiety that will flare periodically.  When that happens, he will get very upset and start to vomit and cough.  This is challenging to deal with.  He has been here multiple times in the past for this.  He is also had extensive GI evaluation.  I did review his colonoscopy and EGD from August of last year.  There was gastritis noted on EGD.  He is not currently on daily acid blocking medicine though.  He seems to be triggered emotionally by watching the weather forecast or if things do not seem to go his way.    Review of Systems:  Review of Systems   Constitutional:  Negative for chills and fever.   Respiratory:  Positive for cough. Negative for shortness of breath.    Cardiovascular:  Negative for chest pain and palpitations.   Gastrointestinal:  Positive for vomiting. Negative for abdominal pain and nausea.      Objective   Medical History:  Past Medical History:    Allergic    Incontinent of feces    Low back pain    Mental impairment    Nausea and vomiting    Tethered cord     Past Surgical History:    BACK SURGERY    COLONOSCOPY    COLONOSCOPY    Procedure: COLONOSCOPY TO TRANSVERSE WITH DISIMPACTION;  Surgeon: Rusty Baker MD;  Location: [facility];  Service: Gastroenterology;  Laterality: N/A;  FECAL IMPACTION, CONSTIPATION    ENDOSCOPY    Procedure: ESOPHAGOGASTRODUODENOSCOPY with biopsies;  Surgeon: Rusty Baker MD;  Location: [facility] ENDOSCOPY;  Service: Gastroenterology;  Laterality: N/A;  pre- nausea & vomiting  post- mild esopohagitis, mild gastritits      Family History   Family history unknown: Yes     Social History     Tobacco Use    Smoking status: Never    Smokeless tobacco: Never   Substance Use Topics    Alcohol  use: Never       There are no preventive care reminders to display for this patient.     Immunization History   Administered Date(s) Administered    COVID-19 (PFIZER) BIVALENT 12+YRS 12/07/2022    COVID-19 (PFIZER) Purple Cap Monovalent 02/22/2021, 03/22/2021, 01/07/2022    COVID-19 F23 (PFIZER) 12YRS+ (COMIRNATY) 01/13/2024    Flu Vaccine Intradermal Quad 18-64YR 10/28/2009, 11/09/2011    Flu Vaccine Quad PF >36MO 11/19/2020, 10/20/2021    Flu Vaccine Split Quad 01/20/2016, 11/19/2020    Fluzone (or Fluarix & Flulaval for VFC) >6mos 01/20/2016, 11/19/2020, 10/20/2021, 12/07/2022, 11/06/2023    Hep B, Adolescent or Pediatric 07/17/2002, 08/29/2002, 03/04/2003    Hepatitis A 03/27/2019, 12/11/2019    Influenza Seasonal Injectable 10/28/2009, 11/09/2011    MMR 07/17/2002    Td (TDVAX) 03/04/2003    Tdap 11/15/2011, 12/12/2022       No Known Allergies     Medications:  Current Outpatient Medications on File Prior to Visit   Medication Sig    busPIRone (BUSPAR) 10 MG tablet Take 1 tablet by mouth 2 (Two) Times a Day.    CBD (cannabidiol) oral oil Take 1 drop by mouth 2 (Two) Times a Day. 1 drop equals 500mg    docusate sodium (COLACE) 100 MG capsule Take 1 capsule by mouth Every Night.    fluticasone (Flonase) 50 MCG/ACT nasal spray 2 sprays into the nostril(s) as directed by provider Daily.    hydrOXYzine (ATARAX) 25 MG tablet Take 1 tablet by mouth 2 (Two) Times a Day.    lactulose (CHRONULAC) 10 GM/15ML solution Take 30 mL by mouth 2 (Two) Times a Day As Needed (Constipation).    montelukast (SINGULAIR) 10 MG tablet Take 1 tablet by mouth Every Night.    Multiple Vitamins-Minerals (Multivitamin Gummies Mens) chewable tablet Chew 1 each Daily.    ondansetron (ZOFRAN) 4 MG tablet Take 1 tablet by mouth Every 8 (Eight) Hours As Needed for Nausea or Vomiting.    polyethylene glycol (HealthyLax) 17 g packet Take 17 g by mouth Daily.    sertraline (ZOLOFT) 100 MG tablet Take 1 tablet by mouth Daily.    traZODone (DESYREL)  "100 MG tablet Take 1 tablet by mouth Every Night.    Vitamins A & D (vitamin A & D) ointment Apply 1 application topically to the appropriate area as directed As Needed (skin irritation).    [DISCONTINUED] montelukast (SINGULAIR) 5 MG chewable tablet Chew 2 tablets Every Night.     No current facility-administered medications on file prior to visit.       Vital Signs:   /70 (BP Location: Left arm, Patient Position: Sitting)   Pulse 76   Temp 97.5 °F (36.4 °C) (Oral)   Ht 162.6 cm (64.02\")   Wt 57.7 kg (127 lb 3.2 oz)   BMI 21.82 kg/m²       Physical Exam:  Physical Exam  Vitals and nursing note reviewed.   Constitutional:       General: He is not in acute distress.     Appearance: He is not ill-appearing.   HENT:      Right Ear: Tympanic membrane and ear canal normal.      Left Ear: Tympanic membrane and ear canal normal.      Mouth/Throat:      Mouth: Mucous membranes are moist.      Comments: Pharynx appears normal  Eyes:      Extraocular Movements: Extraocular movements intact.      Pupils: Pupils are equal, round, and reactive to light.   Neck:      Thyroid: No thyromegaly.   Cardiovascular:      Rate and Rhythm: Normal rate and regular rhythm.      Heart sounds: No murmur heard.  Pulmonary:      Effort: Pulmonary effort is normal.      Breath sounds: Normal breath sounds.   Abdominal:      General: There is no distension.      Palpations: Abdomen is soft. There is no mass.      Tenderness: There is no abdominal tenderness.   Musculoskeletal:      Cervical back: Normal range of motion.   Skin:     Findings: No lesion or rash.   Neurological:      Mental Status: Mental status is at baseline. He is disoriented.      Cranial Nerves: No cranial nerve deficit.   Psychiatric:         Mood and Affect: Mood is anxious.         Result Review   The following data was reviewed by Osei Okeefe MD on 02/26/2024.  Lab Results   Component Value Date    WBC 5.86 10/04/2023    HGB 15.2 10/04/2023    HCT 46.1 " 10/04/2023    MCV 87.0 10/04/2023     10/04/2023     Lab Results   Component Value Date    GLUCOSE 83 12/18/2023    BUN 12 12/18/2023    CREATININE 0.98 12/18/2023     12/18/2023    K 4.6 12/18/2023     12/18/2023    CO2 25.2 12/18/2023    CALCIUM 9.8 12/18/2023    PROTEINTOT 7.5 12/18/2023    ALBUMIN 4.9 12/18/2023    ALT 22 12/18/2023    AST 23 12/18/2023    ALKPHOS 74 12/18/2023    BILITOT 0.3 12/18/2023    EGFR 105.1 12/18/2023    GLOB 2.6 12/18/2023    AGRATIO 1.9 12/18/2023    BCR 12.2 12/18/2023    ANIONGAP 9.8 12/18/2023      Lab Results   Component Value Date    CHOL 229 (H) 12/18/2023    TRIG 231 (H) 12/18/2023    HDL 37 (L) 12/18/2023     (H) 12/18/2023     Lab Results   Component Value Date    TSH 1.410 12/22/2021     Lab Results   Component Value Date    HGBA1C 5.2 12/29/2020     UPPER GI ENDOSCOPY (08/18/2023 15:06)  COLONOSCOPY (07/05/2023 12:21)    BMI is within normal parameters. No other follow-up for BMI required.         Assessment and Plan:   Today, we have reviewed Rakesh's care.  There are couple of different concerns.  Regarding the previous finding of mild gastritis, I would recommend a daily treatment with famotidine for this.  It is unclear whether this is contributing at all to his symptoms, but there may be some benefit.  Also, he it looks like he has been on multiple medications to try to help with anxiety.  When he has these extreme episodes that include vomiting, it is challenging to deal with.  I do think it is reasonable to use lorazepam on an as needed basis and will prescribe a short-term trial of it.  We discussed potential risks including addiction, impairment, and overdose risk.  Written consent will be obtained.  We will also recommend a follow-up visit with Indira in a few weeks to review this care and to consider how to move forward.  Psychiatric referral may be consideration at some point.     Diagnoses and all orders for this visit:    1. Chronic  superficial gastritis without bleeding (Primary)  -     famotidine (Pepcid) 20 MG tablet; Take 1 tablet by mouth every night at bedtime.  Dispense: 90 tablet; Refill: 3    2. Anxiety  -     LORazepam (ATIVAN) 0.5 MG tablet; Take 1 tablet by mouth Every 8 (Eight) Hours As Needed for Anxiety (With vomiting).  Dispense: 10 tablet; Refill: 0    Follow Up  Return in about 3 weeks (around 3/18/2024) for Recheck.  Patient was given instructions and counseling regarding his condition or for health maintenance advice. Please see specific information pulled into the AVS if appropriate.

## 2024-03-08 ENCOUNTER — OFFICE VISIT (OUTPATIENT)
Dept: FAMILY MEDICINE CLINIC | Age: 34
End: 2024-03-08
Payer: MEDICAID

## 2024-03-08 VITALS
DIASTOLIC BLOOD PRESSURE: 78 MMHG | HEART RATE: 65 BPM | SYSTOLIC BLOOD PRESSURE: 128 MMHG | TEMPERATURE: 97.5 F | HEIGHT: 64 IN | BODY MASS INDEX: 21.58 KG/M2 | WEIGHT: 126.4 LBS

## 2024-03-08 DIAGNOSIS — K59.00 CONSTIPATION, UNSPECIFIED CONSTIPATION TYPE: ICD-10-CM

## 2024-03-08 DIAGNOSIS — F41.9 ANXIETY: Primary | ICD-10-CM

## 2024-03-08 DIAGNOSIS — R11.2 NAUSEA AND VOMITING, UNSPECIFIED VOMITING TYPE: ICD-10-CM

## 2024-03-08 PROCEDURE — 1159F MED LIST DOCD IN RCRD: CPT | Performed by: FAMILY MEDICINE

## 2024-03-08 PROCEDURE — 99213 OFFICE O/P EST LOW 20 MIN: CPT | Performed by: FAMILY MEDICINE

## 2024-03-08 PROCEDURE — 1160F RVW MEDS BY RX/DR IN RCRD: CPT | Performed by: FAMILY MEDICINE

## 2024-03-08 NOTE — PROGRESS NOTES
Chief Complaint  Vomiting (From nervousness, accompanied by caregiver Georgette)    Subjective          Rakesh Mitchell presents to [facility]  History of Present Illness  --CHRONIC CONSTIPATION IS DOING WELL WITH CURRENT TREATMENT  --ANXIETY CONTINUES TO BE AN ISSUE.  WILL BE SEEING A MENTAL HEALTH PROVIDER IN THE NEAR FUTURE  --CONTINUES WITH INTERMITTENT RETCHING AND VOMINTING, FELT BY GASTROENTEROLOGY TO BE RELATED TO THE ANXIETY.  NORMAL COLONOSCOPY, EGD REVEALED SOME MILD GASTRITIS.  HAD SOME RETCHING THIS MORNING.  OK NOW.          No Known Allergies     There are no preventive care reminders to display for this patient.     Current Outpatient Medications on File Prior to Visit   Medication Sig    busPIRone (BUSPAR) 10 MG tablet Take 1 tablet by mouth 2 (Two) Times a Day.    CBD (cannabidiol) oral oil Take 1 drop by mouth 2 (Two) Times a Day. 1 drop equals 500mg    docusate sodium (COLACE) 100 MG capsule Take 1 capsule by mouth Every Night.    famotidine (Pepcid) 20 MG tablet Take 1 tablet by mouth every night at bedtime.    fluticasone (Flonase) 50 MCG/ACT nasal spray 2 sprays into the nostril(s) as directed by provider Daily.    hydrOXYzine (ATARAX) 25 MG tablet Take 1 tablet by mouth 2 (Two) Times a Day.    lactulose (CHRONULAC) 10 GM/15ML solution Take 30 mL by mouth 2 (Two) Times a Day As Needed (Constipation).    LORazepam (ATIVAN) 0.5 MG tablet Take 1 tablet by mouth Every 8 (Eight) Hours As Needed for Anxiety (With vomiting).    montelukast (SINGULAIR) 10 MG tablet Take 1 tablet by mouth Every Night.    Multiple Vitamins-Minerals (Multivitamin Gummies Mens) chewable tablet Chew 1 each Daily.    ondansetron (ZOFRAN) 4 MG tablet Take 1 tablet by mouth Every 8 (Eight) Hours As Needed for Nausea or Vomiting.    polyethylene glycol (HealthyLax) 17 g packet Take 17 g by mouth Daily.    sertraline (ZOLOFT) 100 MG tablet Take 1 tablet by mouth Daily.    traZODone (DESYREL) 100 MG  "tablet Take 1 tablet by mouth Every Night.    Vitamins A & D (vitamin A & D) ointment Apply 1 application topically to the appropriate area as directed As Needed (skin irritation).     No current facility-administered medications on file prior to visit.       Immunization History   Administered Date(s) Administered    COVID-19 (PFIZER) BIVALENT 12+YRS 12/07/2022    COVID-19 (PFIZER) Purple Cap Monovalent 02/22/2021, 03/22/2021, 01/07/2022    COVID-19 F23 (PFIZER) 12YRS+ (COMIRNATY) 01/13/2024    Flu Vaccine Intradermal Quad 18-64YR 10/28/2009, 11/09/2011    Flu Vaccine Quad PF >36MO 11/19/2020, 10/20/2021    Flu Vaccine Split Quad 01/20/2016, 11/19/2020    Fluzone (or Fluarix & Flulaval for VFC) >6mos 01/20/2016, 11/19/2020, 10/20/2021, 12/07/2022, 11/06/2023    Hep B, Adolescent or Pediatric 07/17/2002, 08/29/2002, 03/04/2003    Hepatitis A 03/27/2019, 12/11/2019    Influenza Seasonal Injectable 10/28/2009, 11/09/2011    MMR 07/17/2002    Td (TDVAX) 03/04/2003    Tdap 11/15/2011, 12/12/2022       Review of Systems   Constitutional:  Negative for activity change, appetite change, chills, fatigue and fever.   HENT:  Negative for congestion, ear pain, rhinorrhea and sore throat.    Respiratory:  Negative for cough and shortness of breath.    Cardiovascular:  Negative for chest pain, palpitations and leg swelling.   Gastrointestinal:  Negative for abdominal pain, constipation and diarrhea.   Musculoskeletal:  Negative for arthralgias and myalgias.   Neurological:  Negative for headache.        Objective     /78 (BP Location: Left arm, Patient Position: Sitting)   Pulse 65   Temp 97.5 °F (36.4 °C) (Axillary)   Ht 162.6 cm (64\")   Wt 57.3 kg (126 lb 6.4 oz)   BMI 21.70 kg/m²       Physical Exam  Vitals and nursing note reviewed.   Constitutional:       General: He is not in acute distress.     Appearance: Normal appearance.   Cardiovascular:      Rate and Rhythm: Normal rate and regular rhythm.      Heart " sounds: Normal heart sounds. No murmur heard.  Pulmonary:      Effort: Pulmonary effort is normal.      Breath sounds: Normal breath sounds.   Abdominal:      Palpations: Abdomen is soft.      Tenderness: There is no abdominal tenderness.   Musculoskeletal:      Cervical back: Neck supple.      Right lower leg: No edema.      Left lower leg: No edema.   Lymphadenopathy:      Cervical: No cervical adenopathy.   Neurological:      General: No focal deficit present.      Mental Status: He is alert.      Cranial Nerves: No cranial nerve deficit.      Coordination: Coordination normal.      Gait: Gait normal.   Psychiatric:         Mood and Affect: Mood normal.         Behavior: Behavior normal.         Result Review :                             Assessment and Plan      Diagnoses and all orders for this visit:    1. Anxiety (Primary)  Assessment & Plan:  NOT AT GOAL, SEE MENTAL HEALTH PROVIDER AS PLANNED       2. Constipation, unspecified constipation type  Assessment & Plan:  IMPROVED WITH CURRENT TREATMENT, CONTINUE SAME, WILL REEVALUATE AT NEXT VISIT       3. Nausea and vomiting, unspecified vomiting type  Assessment & Plan:  RECURRENT, FELT TO BE RELATED TO ANXIETY.  INDIRA SALAZAR, F/U WITH PCP IN TWO WEEKS AS PLANNED               Follow Up     No follow-ups on file.    Patient was given instructions and counseling regarding his condition or for health maintenance advice. Please see specific information pulled into the AVS if appropriate.

## 2024-03-20 DIAGNOSIS — K59.00 CONSTIPATION, UNSPECIFIED CONSTIPATION TYPE: ICD-10-CM

## 2024-03-21 RX ORDER — LACTULOSE 10 G/15ML
SOLUTION ORAL
Qty: 150 ML | Refills: 2 | Status: SHIPPED | OUTPATIENT
Start: 2024-03-21

## 2024-03-26 RX ORDER — POLYETHYLENE GLYCOL 3350 17 G/17G
17 POWDER, FOR SOLUTION ORAL DAILY
Qty: 510 G | Refills: 5 | Status: SHIPPED | OUTPATIENT
Start: 2024-03-26

## 2024-03-26 NOTE — TELEPHONE ENCOUNTER
Okay for refill on MiraLAX equivalent 17 g in 8 ounces of water daily with a total of 510 g with 5 refills per DR Baker.     Script sent as ordered above to pt's [facility] pharmacy.

## 2024-04-05 DIAGNOSIS — F41.9 ANXIETY: ICD-10-CM

## 2024-04-05 RX ORDER — BUSPIRONE HYDROCHLORIDE 10 MG/1
10 TABLET ORAL 2 TIMES DAILY
Qty: 180 TABLET | Refills: 1 | Status: SHIPPED | OUTPATIENT
Start: 2024-04-05

## 2024-04-19 DIAGNOSIS — J30.2 OTHER SEASONAL ALLERGIC RHINITIS: ICD-10-CM

## 2024-04-19 RX ORDER — MONTELUKAST SODIUM 10 MG/1
TABLET ORAL
Qty: 90 TABLET | Refills: 2 | Status: SHIPPED | OUTPATIENT
Start: 2024-04-19

## 2024-04-25 ENCOUNTER — TELEPHONE (OUTPATIENT)
Dept: FAMILY MEDICINE CLINIC | Age: 34
End: 2024-04-25
Payer: MEDICAID

## 2024-04-25 DIAGNOSIS — Z00.00 ROUTINE GENERAL MEDICAL EXAMINATION AT A HEALTH CARE FACILITY: ICD-10-CM

## 2024-04-25 DIAGNOSIS — J30.2 OTHER SEASONAL ALLERGIC RHINITIS: ICD-10-CM

## 2024-04-25 DIAGNOSIS — F41.9 ANXIETY: ICD-10-CM

## 2024-04-25 DIAGNOSIS — K59.00 CONSTIPATION, UNSPECIFIED CONSTIPATION TYPE: ICD-10-CM

## 2024-04-25 DIAGNOSIS — K29.30 CHRONIC SUPERFICIAL GASTRITIS WITHOUT BLEEDING: ICD-10-CM

## 2024-04-25 DIAGNOSIS — J30.2 SEASONAL ALLERGIES: ICD-10-CM

## 2024-04-25 RX ORDER — FLUTICASONE PROPIONATE 50 MCG
2 SPRAY, SUSPENSION (ML) NASAL DAILY
Qty: 15.8 ML | Refills: 11 | Status: CANCELLED | OUTPATIENT
Start: 2024-04-25

## 2024-04-25 RX ORDER — PETROLATUM,WHITE/LANOLIN
1 OINTMENT (GRAM) TOPICAL AS NEEDED
Qty: 113 G | Refills: 11 | Status: CANCELLED | OUTPATIENT
Start: 2024-04-25

## 2024-04-25 RX ORDER — SERTRALINE HYDROCHLORIDE 100 MG/1
100 TABLET, FILM COATED ORAL DAILY
Qty: 90 TABLET | Refills: 3 | Status: CANCELLED | OUTPATIENT
Start: 2024-04-25

## 2024-04-25 RX ORDER — BUSPIRONE HYDROCHLORIDE 10 MG/1
10 TABLET ORAL 2 TIMES DAILY
Qty: 180 TABLET | Refills: 3 | Status: CANCELLED | OUTPATIENT
Start: 2024-04-25

## 2024-04-25 RX ORDER — MONTELUKAST SODIUM 10 MG/1
10 TABLET ORAL NIGHTLY
Qty: 90 TABLET | Refills: 3 | Status: CANCELLED | OUTPATIENT
Start: 2024-04-25

## 2024-04-25 RX ORDER — LORAZEPAM 0.5 MG/1
0.5 TABLET ORAL EVERY 8 HOURS PRN
Qty: 10 TABLET | Refills: 0 | Status: CANCELLED | OUTPATIENT
Start: 2024-04-25

## 2024-04-25 RX ORDER — POLYETHYLENE GLYCOL 3350 17 G/17G
17 POWDER, FOR SOLUTION ORAL DAILY
Qty: 510 G | Refills: 5 | Status: CANCELLED | OUTPATIENT
Start: 2024-04-25

## 2024-04-25 RX ORDER — LACTULOSE 10 G/15ML
30 SOLUTION ORAL 2 TIMES DAILY PRN
Qty: 150 ML | Refills: 2 | Status: CANCELLED | OUTPATIENT
Start: 2024-04-25

## 2024-04-25 RX ORDER — HYDROXYZINE HYDROCHLORIDE 25 MG/1
25 TABLET, FILM COATED ORAL 2 TIMES DAILY
Qty: 180 TABLET | Refills: 3 | Status: CANCELLED | OUTPATIENT
Start: 2024-04-25

## 2024-04-25 RX ORDER — DOCUSATE SODIUM 100 MG/1
100 CAPSULE, LIQUID FILLED ORAL NIGHTLY
Qty: 90 CAPSULE | Refills: 3 | Status: CANCELLED | OUTPATIENT
Start: 2024-04-25

## 2024-04-25 RX ORDER — ONDANSETRON 4 MG/1
4 TABLET, FILM COATED ORAL EVERY 8 HOURS PRN
Qty: 30 TABLET | Refills: 1 | Status: CANCELLED | OUTPATIENT
Start: 2024-04-25

## 2024-04-25 RX ORDER — FAMOTIDINE 20 MG/1
20 TABLET, FILM COATED ORAL
Qty: 90 TABLET | Refills: 3 | Status: CANCELLED | OUTPATIENT
Start: 2024-04-25

## 2024-04-25 RX ORDER — TRAZODONE HYDROCHLORIDE 100 MG/1
100 TABLET ORAL NIGHTLY
Qty: 90 TABLET | Refills: 3 | Status: CANCELLED | OUTPATIENT
Start: 2024-04-25

## 2024-04-25 RX ORDER — CALCIUM CITRATE/VITAMIN D3 200MG-6.25
1 TABLET ORAL DAILY
Qty: 90 TABLET | Refills: 3 | Status: CANCELLED | OUTPATIENT
Start: 2024-04-25

## 2024-04-25 NOTE — TELEPHONE ENCOUNTER
Have they seen mental health provider as Dr. Wright had mentioned in his last note? They would assume care of the mental health meds. karoline Marion

## 2024-04-25 NOTE — TELEPHONE ENCOUNTER
Caller: MARLO CHUNG    Relationship: Emergency Contact    Best call back number: [phone]     Requested Prescriptions:   RAFAEL GUMMIES - 2 GUMMIES TWICE DAILY - FOR STRESS - MUST SPECIFY OTC       Pharmacy where request should be sent: [facility] - [address] - [phone] PH - [phone] FX     Last office visit with prescribing clinician: 12/18/2023   Last telemedicine visit with prescribing clinician: Visit date not found   Next office visit with prescribing clinician: 12/19/2024       Does the patient have less than a 3 day supply:  [x] Yes  [] No      Olga Lidia Miller Rep   04/25/24 14:44 EDT

## 2024-04-25 NOTE — TELEPHONE ENCOUNTER
Caller: MARLO CHUNG    Relationship: Emergency Contact    Best call back number: [phone]     Requested Prescriptions:   Requested Prescriptions     Pending Prescriptions Disp Refills    busPIRone (BUSPAR) 10 MG tablet 180 tablet 1     Sig: Take 1 tablet by mouth 2 (Two) Times a Day.    CBD (cannabidiol) oral oil 30 mL 11     Sig: Take 1 drop by mouth 2 (Two) Times a Day. 1 drop equals 500mg    docusate sodium (COLACE) 100 MG capsule 90 capsule 3     Sig: Take 1 capsule by mouth Every Night.    famotidine (Pepcid) 20 MG tablet 90 tablet 3     Sig: Take 1 tablet by mouth every night at bedtime.    fluticasone (Flonase) 50 MCG/ACT nasal spray 15.8 mL 11     Si sprays into the nostril(s) as directed by provider Daily.    hydrOXYzine (ATARAX) 25 MG tablet       Sig: Take 1 tablet by mouth 2 (Two) Times a Day.    lactulose (CHRONULAC) 10 GM/15ML solution 150 mL 2    LORazepam (ATIVAN) 0.5 MG tablet 10 tablet 0     Sig: Take 1 tablet by mouth Every 8 (Eight) Hours As Needed for Anxiety (With vomiting).    montelukast (SINGULAIR) 10 MG tablet 90 tablet 2    Multiple Vitamins-Minerals (Multivitamin Gummies Mens) chewable tablet 90 tablet 3     Sig: Chew 1 each Daily.    ondansetron (ZOFRAN) 4 MG tablet       Sig: Take 1 tablet by mouth Every 8 (Eight) Hours As Needed for Nausea or Vomiting.    polyethylene glycol (GoodSense ClearLax) 17 GM/SCOOP powder 510 g 5     Sig: Take 17 g by mouth Daily.    sertraline (ZOLOFT) 100 MG tablet 90 tablet 3     Sig: Take 1 tablet by mouth Daily.    traZODone (DESYREL) 100 MG tablet 90 tablet 3     Sig: Take 1 tablet by mouth Every Night.    vitamin A & D ointment 113 g 11     Sig: Apply 1 Application topically to the appropriate area as directed As Needed (skin irritation).        Pharmacy where request should be sent: [facility] [address] - [phone]  - [phone] FX     Last office visit with prescribing clinician: 2023   Last  telemedicine visit with prescribing clinician: Visit date not found   Next office visit with prescribing clinician: 12/19/2024     Does the patient have less than a 3 day supply:  [] Yes  [x] No    Olga Lidia Miller Rep   04/25/24 14:42 EDT

## 2024-04-26 RX ORDER — MULTIVIT-MIN/IRON FUM/FOLIC AC 7.5 MG-4
TABLET ORAL
Qty: 180 EACH | Refills: 3 | Status: SHIPPED | OUTPATIENT
Start: 2024-04-26

## 2024-04-26 NOTE — TELEPHONE ENCOUNTER
Georgette states he sees [facility] now and they will do all the mental health meds.  She just needs the Rui Stress gummies and the CBD oil sent in for him.  I can not find Rui stress gummies to add to his list.  She was very specific that she needed that exact thing.

## 2024-08-02 ENCOUNTER — PATIENT ROUNDING (BHMG ONLY) (OUTPATIENT)
Dept: FAMILY MEDICINE CLINIC | Facility: CLINIC | Age: 34
End: 2024-08-02

## 2024-08-02 ENCOUNTER — OFFICE VISIT (OUTPATIENT)
Dept: FAMILY MEDICINE CLINIC | Facility: CLINIC | Age: 34
End: 2024-08-02
Payer: MEDICAID

## 2024-08-02 VITALS
BODY MASS INDEX: 26.5 KG/M2 | SYSTOLIC BLOOD PRESSURE: 112 MMHG | OXYGEN SATURATION: 100 % | WEIGHT: 155.2 LBS | DIASTOLIC BLOOD PRESSURE: 70 MMHG | HEIGHT: 64 IN | HEART RATE: 83 BPM

## 2024-08-02 DIAGNOSIS — F41.9 ANXIETY: ICD-10-CM

## 2024-08-02 DIAGNOSIS — F50.89 PSYCHOGENIC VOMITING WITH NAUSEA: Primary | ICD-10-CM

## 2024-08-02 DIAGNOSIS — Z87.898 HISTORY OF SEIZURE: ICD-10-CM

## 2024-08-02 DIAGNOSIS — K52.9 CHRONIC DIARRHEA: ICD-10-CM

## 2024-08-02 PROCEDURE — 1159F MED LIST DOCD IN RCRD: CPT | Performed by: NURSE PRACTITIONER

## 2024-08-02 PROCEDURE — 1160F RVW MEDS BY RX/DR IN RCRD: CPT | Performed by: NURSE PRACTITIONER

## 2024-08-02 PROCEDURE — 99214 OFFICE O/P EST MOD 30 MIN: CPT | Performed by: NURSE PRACTITIONER

## 2024-08-02 RX ORDER — ONDANSETRON 4 MG/1
4 TABLET, FILM COATED ORAL EVERY 8 HOURS PRN
Qty: 60 TABLET | Refills: 5 | Status: SHIPPED | OUTPATIENT
Start: 2024-08-02

## 2024-08-02 RX ORDER — LORAZEPAM 0.5 MG/1
0.5 TABLET ORAL EVERY 8 HOURS PRN
Qty: 90 TABLET | Refills: 0 | Status: SHIPPED | OUTPATIENT
Start: 2024-08-02

## 2024-08-02 NOTE — PROGRESS NOTES
Chief Complaint  Diarrhea (Pt has diarrhea multiple times a day from certain medications, and is looking to change/remove some medications. Also history of seizures. )    Subjective        Rakesh Mitchell presents to [facility]  History of Present Illness  Mr. Mitchell is here to establish care. He is with a new care giver. He is a [facility] of the [facility]. Here for medication review. He needs to go back to [facility], needs referrals for GI and neurology. His previous care giver became ill and never followed up with GI and neuro. His one and only was seizure 3 months ago. With no previous history of seizures. He was admitted to [facility].       I have reviewed the patient's medical history in detail and updated the computerized patient record.       Current Outpatient Medications:     busPIRone (BUSPAR) 10 MG tablet, TAKE 1 (ONE) TABLET BY MOUTH TWICE DAILY, Disp: 180 tablet, Rfl: 1    CBD (cannabidiol) oral oil, Take 1 drop by mouth 2 (Two) Times a Day. 1 drop equals 500mg, Disp: 30 mL, Rfl: 11    docusate sodium (COLACE) 100 MG capsule, Take 1 capsule by mouth Every Night., Disp: 90 capsule, Rfl: 3    famotidine (Pepcid) 20 MG tablet, Take 1 tablet by mouth every night at bedtime., Disp: 90 tablet, Rfl: 3    fluticasone (Flonase) 50 MCG/ACT nasal spray, 2 sprays into the nostril(s) as directed by provider Daily., Disp: 15.8 mL, Rfl: 11    lactulose (CHRONULAC) 10 GM/15ML solution, TAKE 30 ML BY MOUTH TWICE DAILY AS NEEDED FOR CONSTIPATION, Disp: 150 mL, Rfl: 2    LORazepam (ATIVAN) 0.5 MG tablet, Take 1 tablet by mouth Every 8 (Eight) Hours As Needed for Anxiety (and at first signs of anxiety attacks)., Disp: 90 tablet, Rfl: 0    montelukast (SINGULAIR) 10 MG tablet, TAKE 1 (ONE) TABLET EVERY NIGHT, Disp: 90 tablet, Rfl: 2    Multiple Vitamins-Minerals (Multivitamin Gummies Mens) chewable tablet, Chew 1 each Daily., Disp: 90 tablet, Rfl: 3     "multivitamin with minerals tablet tablet, Boy Stress Gummies - take 2 gummies daily as directed on bottle, Disp: 180 each, Rfl: 3    ondansetron (ZOFRAN) 4 MG tablet, Take 1 tablet by mouth Every 8 (Eight) Hours As Needed for Nausea or Vomiting., Disp: 60 tablet, Rfl: 5    polyethylene glycol (GoodSense ClearLax) 17 GM/SCOOP powder, Take 17 g by mouth Daily., Disp: 510 g, Rfl: 5    polyethylene glycol (HealthyLax) 17 g packet, Take 17 g by mouth Daily., Disp: 30 packet, Rfl: 5    sertraline (ZOLOFT) 100 MG tablet, Take 1 tablet by mouth Daily., Disp: 90 tablet, Rfl: 3    traZODone (DESYREL) 100 MG tablet, Take 1 tablet by mouth Every Night., Disp: 90 tablet, Rfl: 3    Vitamins A & D (vitamin A & D) ointment, Apply 1 application topically to the appropriate area as directed As Needed (skin irritation)., Disp: 113 g, Rfl: 11     Objective   Vital Signs:  /70 (BP Location: Right arm, Patient Position: Sitting, Cuff Size: Adult)   Pulse 83   Ht 162.6 cm (64.02\")   Wt 70.4 kg (155 lb 3.2 oz)   SpO2 100%   BMI 26.63 kg/m²   Estimated body mass index is 26.63 kg/m² as calculated from the following:    Height as of this encounter: 162.6 cm (64.02\").    Weight as of this encounter: 70.4 kg (155 lb 3.2 oz).             Physical Exam  Vitals reviewed.   Constitutional:       Appearance: Normal appearance.   Cardiovascular:      Pulses: Normal pulses.      Heart sounds: Normal heart sounds.   Pulmonary:      Effort: Pulmonary effort is normal.      Breath sounds: Normal breath sounds.   Skin:     General: Skin is warm and dry.   Neurological:      Mental Status: He is alert and oriented to person, place, and time.      Comments: Mental impairment noted by his behavior   Psychiatric:      Comments: Anxious, constantly wringing his hands during the visit        Result Review :                     Assessment and Plan     Diagnoses and all orders for this visit:    1. Psychogenic vomiting with nausea (Primary)  -     " ondansetron (ZOFRAN) 4 MG tablet; Take 1 tablet by mouth Every 8 (Eight) Hours As Needed for Nausea or Vomiting.  Dispense: 60 tablet; Refill: 5    2. Anxiety  -     LORazepam (ATIVAN) 0.5 MG tablet; Take 1 tablet by mouth Every 8 (Eight) Hours As Needed for Anxiety (and at first signs of anxiety attacks).  Dispense: 90 tablet; Refill: 0             Follow Up     Return in about 5 months (around 12/18/2024), or if symptoms worsen or fail to improve, for Annual physical, Fasting labs 1 week prior to next f/u.  Patient was given instructions and counseling regarding his condition or for health maintenance advice. Please see specific information pulled into the AVS if appropriate.

## 2024-08-30 DIAGNOSIS — F41.9 ANXIETY: ICD-10-CM

## 2024-08-30 RX ORDER — LORAZEPAM 0.5 MG/1
TABLET ORAL
Qty: 90 TABLET | Refills: 0 | OUTPATIENT
Start: 2024-08-30

## 2024-08-30 NOTE — TELEPHONE ENCOUNTER
He needs to get the Lorazepam from his psychiatrist. I only filled it so that he had enough medications to get him to seeing his psychiatrist. I had explained that at the office visit. Thanks.

## 2024-09-16 RX ORDER — POLYETHYLENE GLYCOL 3350 17 G/17G
POWDER, FOR SOLUTION ORAL
Qty: 510 G | Refills: 5 | Status: SHIPPED | OUTPATIENT
Start: 2024-09-16

## 2024-09-27 ENCOUNTER — OFFICE VISIT (OUTPATIENT)
Dept: GASTROENTEROLOGY | Facility: CLINIC | Age: 34
End: 2024-09-27
Payer: MEDICAID

## 2024-09-27 VITALS
SYSTOLIC BLOOD PRESSURE: 128 MMHG | OXYGEN SATURATION: 98 % | HEIGHT: 64 IN | DIASTOLIC BLOOD PRESSURE: 76 MMHG | HEART RATE: 93 BPM | WEIGHT: 158.9 LBS | BODY MASS INDEX: 27.13 KG/M2

## 2024-09-27 DIAGNOSIS — F50.89 PSYCHOGENIC VOMITING WITH NAUSEA: Primary | ICD-10-CM

## 2024-09-27 DIAGNOSIS — K59.04 CHRONIC IDIOPATHIC CONSTIPATION: ICD-10-CM

## 2024-09-27 DIAGNOSIS — F41.9 ANXIETY: ICD-10-CM

## 2024-09-27 RX ORDER — ALUMINUM ZIRCONIUM OCTACHLOROHYDREX GLY 16 G/100G
GEL TOPICAL TAKE AS DIRECTED
Qty: 660 G | Refills: 12 | Status: SHIPPED | OUTPATIENT
Start: 2024-09-27 | End: 2024-09-27

## 2024-09-27 RX ORDER — POLYETHYLENE GLYCOL 3350 17 G/17G
17 POWDER, FOR SOLUTION ORAL AS NEEDED
Qty: 510 G | Refills: 5 | Status: SHIPPED | OUTPATIENT
Start: 2024-09-27 | End: 2024-09-27

## 2024-09-27 RX ORDER — ALUMINUM ZIRCONIUM OCTACHLOROHYDREX GLY 16 G/100G
GEL TOPICAL TAKE AS DIRECTED
Qty: 660 G | Refills: 12 | Status: SHIPPED | OUTPATIENT
Start: 2024-09-27

## 2024-09-27 RX ORDER — POLYETHYLENE GLYCOL 3350 17 G/17G
17 POWDER, FOR SOLUTION ORAL AS NEEDED
Qty: 510 G | Refills: 5 | Status: SHIPPED | OUTPATIENT
Start: 2024-09-27

## 2024-09-30 RX ORDER — BUSPIRONE HYDROCHLORIDE 10 MG/1
10 TABLET ORAL 2 TIMES DAILY
Qty: 180 TABLET | Refills: 1 | OUTPATIENT
Start: 2024-09-30

## 2024-10-01 DIAGNOSIS — F41.9 ANXIETY: ICD-10-CM

## 2024-10-17 NOTE — TELEPHONE ENCOUNTER
Caller: SELENE VALDEZ    Relationship: Emergency Contact    Best call back number: [phone]    What was the call regarding: SELENE IS CALLING AND NEEDS TO GET A NEW PRESCRIPTION SENT OVER TO THE PHARMACY     busPIRone (BUSPAR) 10 MG tablet     [facility] [address] - [phone] PH - [phone] FX       SELENE ALSO NEEDS TO STOP BY THE OFFICE TO GET A GAP COVERAGE SCRIPT FOR THE DELAY     PLEASE GIVE CALL BACK TO LET HER KNOW WHEN SHE CAN STOP BY OFFICE

## 2024-10-18 DIAGNOSIS — F41.9 ANXIETY: ICD-10-CM

## 2024-10-18 RX ORDER — BUSPIRONE HYDROCHLORIDE 10 MG/1
10 TABLET ORAL 2 TIMES DAILY
Qty: 180 TABLET | Refills: 1 | OUTPATIENT
Start: 2024-10-18

## 2024-10-18 NOTE — TELEPHONE ENCOUNTER
Caller: SELENE VALDEZ    Relationship: Emergency Contact    Best call back number: [phone]     Requested Prescriptions:   Requested Prescriptions     Pending Prescriptions Disp Refills    busPIRone (BUSPAR) 10 MG tablet 180 tablet 1     Sig: Take 1 tablet by mouth 2 (Two) Times a Day.        Pharmacy where request should be sent: [address] - [phone] PH - [phone] FX     Last office visit with prescribing clinician: 8/2/2024   Last telemedicine visit with prescribing clinician: Visit date not found   Next office visit with prescribing clinician: 12/19/2024     Additional details provided by patient: PATIENT HAS ONE DOSE LEFT OF THIS MEDICATION     Does the patient have less than a 3 day supply:  [x] Yes  [] No    Would you like a call back once the refill request has been completed: [] Yes [x] No    If the office needs to give you a call back, can they leave a voicemail: [] Yes [x] No    Olga Lidia Stevens Rep   10/18/24 09:53 EDT

## 2024-10-21 DIAGNOSIS — F41.9 ANXIETY: ICD-10-CM

## 2024-10-21 RX ORDER — BUSPIRONE HYDROCHLORIDE 10 MG/1
10 TABLET ORAL 2 TIMES DAILY
Qty: 180 TABLET | Refills: 1 | Status: SHIPPED | OUTPATIENT
Start: 2024-10-21

## 2024-10-21 RX ORDER — BUSPIRONE HYDROCHLORIDE 10 MG/1
10 TABLET ORAL 2 TIMES DAILY
Qty: 180 TABLET | Refills: 1 | OUTPATIENT
Start: 2024-10-21

## 2024-10-30 ENCOUNTER — OFFICE VISIT (OUTPATIENT)
Dept: PSYCHIATRY | Facility: CLINIC | Age: 34
End: 2024-10-30
Payer: MEDICAID

## 2024-10-30 VITALS
HEART RATE: 75 BPM | HEIGHT: 64 IN | OXYGEN SATURATION: 98 % | BODY MASS INDEX: 26.73 KG/M2 | SYSTOLIC BLOOD PRESSURE: 128 MMHG | WEIGHT: 156.6 LBS | DIASTOLIC BLOOD PRESSURE: 75 MMHG

## 2024-10-30 DIAGNOSIS — F43.10 PTSD (POST-TRAUMATIC STRESS DISORDER): ICD-10-CM

## 2024-10-30 DIAGNOSIS — F70 MILD INTELLECTUAL DISABILITY: ICD-10-CM

## 2024-10-30 DIAGNOSIS — F39 MOOD DISORDER: ICD-10-CM

## 2024-10-30 DIAGNOSIS — R46.89 AGGRESSION: Primary | ICD-10-CM

## 2024-10-30 DIAGNOSIS — F51.04 INSOMNIA, PSYCHOPHYSIOLOGICAL: ICD-10-CM

## 2024-10-30 RX ORDER — TRAZODONE HYDROCHLORIDE 100 MG/1
TABLET ORAL
Qty: 83 TABLET | Refills: 0 | Status: SHIPPED | OUTPATIENT
Start: 2024-10-30 | End: 2024-11-29

## 2024-10-30 RX ORDER — RISPERIDONE 1 MG/1
0.5 TABLET ORAL 2 TIMES DAILY
Qty: 30 TABLET | Refills: 2 | Status: SHIPPED | OUTPATIENT
Start: 2024-10-30 | End: 2025-01-28
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
## 2024-10-31 ENCOUNTER — TELEPHONE (OUTPATIENT)
Dept: GASTROENTEROLOGY | Facility: CLINIC | Age: 34
End: 2024-10-31
Payer: MEDICAID

## 2024-10-31 DIAGNOSIS — K59.04 CHRONIC IDIOPATHIC CONSTIPATION: ICD-10-CM

## 2024-10-31 RX ORDER — POLYETHYLENE GLYCOL 3350 17 G/17G
POWDER, FOR SOLUTION ORAL
Qty: 510 G | Refills: 5 | Status: SHIPPED | OUTPATIENT
Start: 2024-10-31

## 2024-10-31 NOTE — TELEPHONE ENCOUNTER
Caller: SELENE VALDEZ    Relationship: Emergency Contact    Best call back number: [phone]; NO VM    What form or medical record are you requesting: D/C ORDER    Who is requesting this form or medical record from you: [facility]    How would you like to receive the form or medical records (pick-up, mail, fax): FAX  If fax, what is the fax number: [phone] ATTN GIOVANNI    Timeframe paperwork needed: ASAP    Additional notes: THEY NEED A NEW ORDER FOR THE CHANGE IN PRESCRIPTION. THEY NEED IT TO SAY TO DISCONTINUE THE DAILY CLEARLAX, TO BE EVERY 48 HOURS AS NEEDED IF NO BOWEL MOVEMENT. ALSO TO SAY TO DISCONTINUE THE LACTULOSE COMPLETELY. - THEIR NURSE IS SAYING THE LACTULOSE AND CLEARLAX CONTRADICT EACH OTHER

## 2024-10-31 NOTE — TELEPHONE ENCOUNTER
I changed the order for the ClearLax as they requested.  Not sure if they need it faxed over to them-I did send to [facility].    I will defer to Hilda when she gets back regarding the lactulose as this is not mentioned in her note although it is on his medication list.  She might want him on both ClearLax and lactulose.  These do not contradict each other.  Yes, they are both constipation medications but some people have such severe constipation that they need to be on both.    Hilda would likely benefit from knowing how his bowel movements are doing with his current regimen and what exactly is he taking for his constipation at this time and how often?

## 2024-11-01 ENCOUNTER — TELEPHONE (OUTPATIENT)
Dept: GASTROENTEROLOGY | Facility: CLINIC | Age: 34
End: 2024-11-01
Payer: MEDICAID

## 2024-11-01 NOTE — TELEPHONE ENCOUNTER
Call placed to Grace Estrella. Advised as per Gauri's note. She requested the new script be faxed to [phone]. Script faxed and confirmation received. Will await further orders from Hilda.

## 2024-11-01 NOTE — TELEPHONE ENCOUNTER
Hub staff attempted to follow warm transfer process and was unsuccessful     Caller: SELENE VALDEZ     Relationship to patient: CARE GIVER     Best call back number: [phone]     Patient is needing: PT WOULD LIKE TO SPEAK TO A NURSE OR SOMEONE ABOUT PT'S MEDICATION SHE NEEDS THE LACTULOSE D'CD AND THE CLEARLAX DAILY D'CD PLEASE ADVISE AND CALL CAREGIVER BACK          Upset that she did not get to go home today. Encouraged to do more today and took a shower . Also attended groups , was unab le to recognize words or read today. States her daughter makes a lot of decisions for her and takes care of paper work.

## 2024-11-04 ENCOUNTER — TELEPHONE (OUTPATIENT)
Dept: GASTROENTEROLOGY | Facility: CLINIC | Age: 34
End: 2024-11-04
Payer: MEDICAID

## 2024-11-04 ENCOUNTER — TELEPHONE (OUTPATIENT)
Dept: PSYCHIATRY | Facility: CLINIC | Age: 34
End: 2024-11-04
Payer: MEDICAID

## 2024-11-04 NOTE — TELEPHONE ENCOUNTER
Hub staff attempted to follow warm transfer process and was unsuccessful     Caller: SELENE VALDEZ    Relationship to patient: Emergency Contact    Best call back number: [phone] (home)     Patient is needing: SELENE IS RETURNING PHONE CALL FROM JOANNA ABOUT MEDICATION. PLEASE CALL SELENE BACK AND ADVISE

## 2024-11-07 ENCOUNTER — TELEPHONE (OUTPATIENT)
Dept: GASTROENTEROLOGY | Facility: CLINIC | Age: 34
End: 2024-11-07

## 2024-11-07 NOTE — TELEPHONE ENCOUNTER
Hub staff attempted to follow warm transfer process and was unsuccessful     Caller: SELENE VALDEZ    Relationship to patient: Emergency Contact    Best call back number: [phone]    Patient is needing: PATIENT CAREGIVER NEEDS THE D'CD FOR THE CLEARLAX AND LACTULOSE BOTH SENT TO [facility] -[phone] ATTN ZAIN GUTIÉRREZ  ALSO DATE IT FOR 9-27-24 WHEN SELENE WAS TOLD TO STOP GIVING IT TO THE PATIENT. STATES SHE WOULD LIKE TO PASS MESSAGE TO JIMMIE CASTELLANOS THAT PATIENT IS DOING REALLY WELL SINCE BEING TAKEN OFF AND ONLY HAD TO USE ONE TIME.

## 2024-11-07 NOTE — TELEPHONE ENCOUNTER
Spoke with Grace, michael and she states they need a dc order for routine Lactulose and Miralax dosing dated 9/27/24 to update MAR.  Msg sent to MYRIAM Stevens.  DC order placed on Hilda's desk for signature.

## 2024-11-08 ENCOUNTER — LAB (OUTPATIENT)
Dept: LAB | Facility: HOSPITAL | Age: 34
End: 2024-11-08
Payer: MEDICAID

## 2024-11-08 DIAGNOSIS — F39 MOOD DISORDER: ICD-10-CM

## 2024-11-08 LAB
ANION GAP SERPL CALCULATED.3IONS-SCNC: 14 MMOL/L (ref 5–15)
BASOPHILS # BLD AUTO: 0.03 10*3/MM3 (ref 0–0.2)
BASOPHILS NFR BLD AUTO: 0.6 % (ref 0–1.5)
BUN SERPL-MCNC: 10 MG/DL (ref 6–20)
BUN/CREAT SERPL: 11 (ref 7–25)
CALCIUM SPEC-SCNC: 9 MG/DL (ref 8.6–10.5)
CHLORIDE SERPL-SCNC: 101 MMOL/L (ref 98–107)
CO2 SERPL-SCNC: 21 MMOL/L (ref 22–29)
CREAT SERPL-MCNC: 0.91 MG/DL (ref 0.76–1.27)
DEPRECATED RDW RBC AUTO: 39.7 FL (ref 37–54)
EGFRCR SERPLBLD CKD-EPI 2021: 114.1 ML/MIN/1.73
EOSINOPHIL # BLD AUTO: 0.07 10*3/MM3 (ref 0–0.4)
EOSINOPHIL NFR BLD AUTO: 1.4 % (ref 0.3–6.2)
ERYTHROCYTE [DISTWIDTH] IN BLOOD BY AUTOMATED COUNT: 12 % (ref 12.3–15.4)
GLUCOSE SERPL-MCNC: 131 MG/DL (ref 65–99)
HCT VFR BLD AUTO: 42.8 % (ref 37.5–51)
HGB BLD-MCNC: 14.5 G/DL (ref 13–17.7)
IMM GRANULOCYTES # BLD AUTO: 0.03 10*3/MM3 (ref 0–0.05)
IMM GRANULOCYTES NFR BLD AUTO: 0.6 % (ref 0–0.5)
LYMPHOCYTES # BLD AUTO: 1.31 10*3/MM3 (ref 0.7–3.1)
LYMPHOCYTES NFR BLD AUTO: 25.8 % (ref 19.6–45.3)
MCH RBC QN AUTO: 30.8 PG (ref 26.6–33)
MCHC RBC AUTO-ENTMCNC: 33.9 G/DL (ref 31.5–35.7)
MCV RBC AUTO: 90.9 FL (ref 79–97)
MONOCYTES # BLD AUTO: 0.48 10*3/MM3 (ref 0.1–0.9)
MONOCYTES NFR BLD AUTO: 9.4 % (ref 5–12)
NEUTROPHILS NFR BLD AUTO: 3.16 10*3/MM3 (ref 1.7–7)
NEUTROPHILS NFR BLD AUTO: 62.2 % (ref 42.7–76)
NRBC BLD AUTO-RTO: 0 /100 WBC (ref 0–0.2)
PLATELET # BLD AUTO: 238 10*3/MM3 (ref 140–450)
PMV BLD AUTO: 9.3 FL (ref 6–12)
POTASSIUM SERPL-SCNC: 3.8 MMOL/L (ref 3.5–5.2)
RBC # BLD AUTO: 4.71 10*6/MM3 (ref 4.14–5.8)
SODIUM SERPL-SCNC: 136 MMOL/L (ref 136–145)
T-UPTAKE NFR SERPL: 1.09 TBI (ref 0.8–1.3)
T4 SERPL-MCNC: 5.11 MCG/DL (ref 4.5–11.7)
TSH SERPL DL<=0.05 MIU/L-ACNC: 1.05 UIU/ML (ref 0.27–4.2)
WBC NRBC COR # BLD AUTO: 5.08 10*3/MM3 (ref 3.4–10.8)

## 2024-11-08 PROCEDURE — 84443 ASSAY THYROID STIM HORMONE: CPT

## 2024-11-08 PROCEDURE — 85025 COMPLETE CBC W/AUTO DIFF WBC: CPT

## 2024-11-08 PROCEDURE — 84479 ASSAY OF THYROID (T3 OR T4): CPT

## 2024-11-08 PROCEDURE — 80048 BASIC METABOLIC PNL TOTAL CA: CPT

## 2024-11-08 PROCEDURE — 36415 COLL VENOUS BLD VENIPUNCTURE: CPT

## 2024-11-08 PROCEDURE — 84436 ASSAY OF TOTAL THYROXINE: CPT

## 2024-11-20 DIAGNOSIS — F39 MOOD DISORDER: ICD-10-CM

## 2024-11-20 RX ORDER — SERTRALINE HYDROCHLORIDE 100 MG/1
100 TABLET, FILM COATED ORAL DAILY
Qty: 90 TABLET | Refills: 3 | OUTPATIENT
Start: 2024-11-20

## 2024-11-20 NOTE — TELEPHONE ENCOUNTER
REFILL REQUEST:     traZODone (DESYREL) 100 MG tablet (10/30/2024)     F/UP- 12/13/2024.  LOV: 10/30/2024.

## 2024-11-21 RX ORDER — SERTRALINE HYDROCHLORIDE 100 MG/1
100 TABLET, FILM COATED ORAL DAILY
Qty: 90 TABLET | Refills: 3 | Status: SHIPPED | OUTPATIENT
Start: 2024-11-21

## 2024-11-21 RX ORDER — TRAZODONE HYDROCHLORIDE 100 MG/1
300 TABLET ORAL NIGHTLY
Qty: 90 TABLET | Refills: 2 | Status: SHIPPED | OUTPATIENT
Start: 2024-11-21 | End: 2025-02-19

## 2024-11-25 DIAGNOSIS — Z00.00 ANNUAL PHYSICAL EXAM: Primary | ICD-10-CM

## 2024-12-06 DIAGNOSIS — Z00.00 ROUTINE GENERAL MEDICAL EXAMINATION AT A HEALTH CARE FACILITY: ICD-10-CM

## 2024-12-06 DIAGNOSIS — K59.00 CONSTIPATION, UNSPECIFIED CONSTIPATION TYPE: ICD-10-CM

## 2024-12-06 DIAGNOSIS — J30.2 SEASONAL ALLERGIES: ICD-10-CM

## 2024-12-06 RX ORDER — CALCIUM CITRATE/VITAMIN D3 200MG-6.25
1 TABLET ORAL DAILY
Qty: 90 TABLET | Refills: 3 | Status: SHIPPED | OUTPATIENT
Start: 2024-12-06

## 2024-12-06 RX ORDER — FLUTICASONE PROPIONATE 50 MCG
2 SPRAY, SUSPENSION (ML) NASAL DAILY
Qty: 15.8 G | Refills: 5 | Status: SHIPPED | OUTPATIENT
Start: 2024-12-06

## 2024-12-06 RX ORDER — DOCUSATE SODIUM 100 MG/1
100 CAPSULE, LIQUID FILLED ORAL NIGHTLY
Qty: 90 CAPSULE | Refills: 3 | Status: SHIPPED | OUTPATIENT
Start: 2024-12-06

## 2024-12-06 NOTE — TELEPHONE ENCOUNTER
Caller: SELENE VALDEZ    Relationship: Emergency Contact    Best call back number: [phone]     Requested Prescriptions:   Requested Prescriptions     Pending Prescriptions Disp Refills    Multiple Vitamins-Minerals (Multivitamin Gummies Mens) chewable tablet 90 tablet 3     Sig: Chew 1 each Daily.    fluticasone (Flonase) 50 MCG/ACT nasal spray       Sig: Administer 2 sprays into the nostril(s) as directed by provider Daily.    docusate sodium (COLACE) 100 MG capsule 90 capsule 3     Sig: Take 1 capsule by mouth Every Night.        Pharmacy where request should be sent: [facility] [address] - [phone]  - [phone]     Last office visit with prescribing clinician: 8/2/2024   Last telemedicine visit with prescribing clinician: Visit date not found   Next office visit with prescribing clinician: 12/19/2024     Does the patient have less than a 3 day supply:  [x] Yes  [] No    Would you like a call back once the refill request has been completed: [] Yes [x] No    If the office needs to give you a call back, can they leave a voicemail: [] Yes [x] No    Olga Lidia Mcneal Rep   12/06/24 14:50 EST     ”

## 2024-12-09 ENCOUNTER — OFFICE VISIT (OUTPATIENT)
Dept: NEUROLOGY | Facility: CLINIC | Age: 34
End: 2024-12-09
Payer: MEDICAID

## 2024-12-09 VITALS
HEIGHT: 64 IN | BODY MASS INDEX: 28.85 KG/M2 | WEIGHT: 169 LBS | HEART RATE: 102 BPM | SYSTOLIC BLOOD PRESSURE: 120 MMHG | DIASTOLIC BLOOD PRESSURE: 79 MMHG

## 2024-12-09 DIAGNOSIS — R56.9 NEW ONSET SEIZURE: Primary | ICD-10-CM

## 2024-12-09 PROCEDURE — 1160F RVW MEDS BY RX/DR IN RCRD: CPT | Performed by: PSYCHIATRY & NEUROLOGY

## 2024-12-09 PROCEDURE — 99203 OFFICE O/P NEW LOW 30 MIN: CPT | Performed by: PSYCHIATRY & NEUROLOGY

## 2024-12-09 PROCEDURE — 1159F MED LIST DOCD IN RCRD: CPT | Performed by: PSYCHIATRY & NEUROLOGY

## 2024-12-09 RX ORDER — FLUTICASONE PROPIONATE 50 MCG
SPRAY, SUSPENSION (ML) NASAL
Qty: 16 G | Refills: 11 | OUTPATIENT
Start: 2024-12-09

## 2024-12-09 RX ORDER — DOCUSATE SODIUM 100 MG/1
CAPSULE, LIQUID FILLED ORAL
Qty: 90 CAPSULE | Refills: 11 | OUTPATIENT
Start: 2024-12-09

## 2024-12-09 RX ORDER — MULTIVIT-MINERALS/FOLIC ACID 200 MCG
TABLET,CHEWABLE ORAL
Qty: 90 TABLET | Refills: 11 | OUTPATIENT
Start: 2024-12-09

## 2024-12-09 NOTE — ASSESSMENT & PLAN NOTE
He had a one-time seizure.  I discussed with them that should have another seizure they are to call the ambulance and if has recovered they do not have to take him to the hospital and they are to follow-up with us and we will put him on antiepileptic medication in the future.  Should he have another seizure he is to stop taking trazodone and take something else different.  I will do an MRI of the brain and then see him again in 6 months time for follow-up.  Thank you for letting me participate in his care
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
## 2024-12-13 ENCOUNTER — OFFICE VISIT (OUTPATIENT)
Dept: PSYCHIATRY | Facility: CLINIC | Age: 34
End: 2024-12-13
Payer: MEDICAID

## 2024-12-13 VITALS
BODY MASS INDEX: 28.58 KG/M2 | SYSTOLIC BLOOD PRESSURE: 110 MMHG | DIASTOLIC BLOOD PRESSURE: 62 MMHG | WEIGHT: 167.4 LBS | HEART RATE: 74 BPM | HEIGHT: 64 IN

## 2024-12-13 DIAGNOSIS — F70 MILD INTELLECTUAL DISABILITY: Primary | ICD-10-CM

## 2024-12-13 DIAGNOSIS — R46.89 AGGRESSION: ICD-10-CM

## 2024-12-13 DIAGNOSIS — F39 MOOD DISORDER: ICD-10-CM

## 2024-12-13 DIAGNOSIS — F43.10 PTSD (POST-TRAUMATIC STRESS DISORDER): ICD-10-CM

## 2024-12-13 DIAGNOSIS — F51.04 INSOMNIA, PSYCHOPHYSIOLOGICAL: ICD-10-CM

## 2024-12-13 RX ORDER — TRAZODONE HYDROCHLORIDE 100 MG/1
200 TABLET ORAL NIGHTLY
Qty: 90 TABLET | Refills: 2 | Status: SHIPPED | OUTPATIENT
Start: 2024-12-13 | End: 2025-04-27

## 2024-12-13 NOTE — PROGRESS NOTES
"[facility]  Follow-up Visit    Chief Complaint: \"he is here for a psychiatric evaluation\" \"I wanted to change and move away from [place]\"      History of Present Illness: Rakesh Mitchell is a smiling 34 y.o. male who presents today for initial evaluation regarding psychiatric care. Rakesh presents accompanied with Grace Estrella (family home provider) in no acute distress and engages with me appropriately. Psychotropic regimen with which patient presents is described as lorazepam 0.5 mg po q 8 hours as needed for anxiety. Trazodone 100 mg po q HS, and sertraline 100 mg po q day, and buspirone 10 mg po BID. He uses CBD oil and Boy's stress gummies that patient pays out of pocket for and wishes to stop both.    Today the patient feels frustrated. Rakesh feels sad because he is tired because he is not sleeping. He has been making himself throw up to stay home from school. Grace reports that \"he seems to do the opposite as what he is being told.\" Rakesh dislikes to bathe himself. Grace reports that he is making himself vomit to gain sympathy and to avoid discipline. Grace reports worsening behaviors at school.       Rakesh alternating symptoms of elevated (irritability and agitation) and depressed moods that do not conform to temporal parameters of bipolar disorder and are not sufficiently encapsulated by MDD. Grace shares that Rakesh becomes more introverted and withdrawn following periods of agitation.       History is positive for signs/symptoms suggestive of panic disorder-recurrent episodes of intense, unprovoked anxiety with chest pain, feelings of doom, dizziness, paresthesias, SOB, and associated avoidance 2/2 fear of recurrence of panic reports overstimulation with noises, changes in schedule, and interruptions within his day. Patient endorses that he picks his arms.      Recent and past history of significant trauma for which there are related intrusion symptoms related to " "the traumatic event (APS and \"re homing\" of patient due to questionable care by previous in home caregiver, also was homeless for a time prior) distressing memories, flashbacks, nightmares, intense distress associated with triggering stimuli, marked physiological reactions to triggering stimuli), persistent avoidance of triggering stimuli, negative alterations in cognition and mood (memory lapses, negative schemas, distorted cognitions about the event, social withdrawal, feelings of detachment/estrangement, persistent anhedonia), and marked alterations in arousal and reactivity (irritability, reckless behavior, hypervigilance, exaggerated startle, sleep disturbances). Grace voices that his distress tolerance has been lowered and she notices that he is on edge.      Psychiatric screening is negative for pathognomonic history of: TBI, psychosis, violence, and suicidality.     Increase trazodone 100 mg po q HS to 200 mg po for week, then increase 300 mg po q HS. Begin risperidone 0.5 mg po BID. I will prescribe risperidone  for behaviors/aggression. Patient has been advised that this agent has propensity to contribute to EPS (particularly dystonia, tremor, akathisia, and TD), weight gain, dyslipidemia, hyperglycemia, reduced arousal, and somnolence. Additionally, risks regarding DRESS, NMS, and diminished seizure threshold have been reviewed today. I have counseled the patient with regard to diagnoses and the recommended treatment regimen as documented below: I will assume prescriptive responsibility for lorazapam 0.5 mg po q 8 hours as needed for anxiety-will work to discontinue, continue sertraline 100 mg po q day, and buspirone 10 mg po BID. Will look for ways to minimize/simplify medication regime and mitigate risks associated with psychiatric polypharmacy. Patient acknowledges the diagnoses per my rendered interpretation. Patient demonstrates awareness/understanding of viable alternatives for treatment as well as " potential risks, benefits, and side effects associated with this regimen and is amenable to proceed in this fashion.      Recommended lifestyle changes: consider joining [employer] for group activities  Psychiatric History:  Diagnoses: mood disorder, mild intellectual disability, ASD?  Outpatient history: [facility]  Inpatient history: none  Medication trials: sertraline,   Other treatment modalities: no  Self harm: skin picking  Suicide attempts: No  Abuse or neglect: questionable with past home care provider     Substance Abuse History:   Types/methods/frequency: *none  Transtheoretical stage: none     Social History:  Residence: lives with Grace Estrella  Vocation: no  Source of income: Social Security  Last grade completed: special education  Pertinent developmental history: IEP during school  Pertinent legal history: No history   Hobbies/interests: not disclosed  Mu-ism: N/A  Exercise:walking  Dietary habits: no pertinent issues   Sleep hygiene: uses trazodone for sleep, problematic at times due to anxiety  Social habits: no pertinent issues   Sunlight: There are no concern for under-exposure.  Caffeine intake: no pertinent issues   Hydration habits: no pertinent issues    history: No       Interval History Rakesh is a 34 y.o. male who presents today for follow-up. Rakesh presents accompanied with Grace Estrella (family home care provider) in no acute distress and engages with me appropriately.   Current treatment regimen includes:   -lorazepam 0.5 mg po prn for anxiety  - buspirone 10 mg po BID  -risperidone 0.5 mg po BID  Sertraline 100 mg po q day  Trazodone 100 mg, takes 3 tablets at bedtime nightly (not sleeping)  Side-effects per given history:none.        Reviewed bedtime routine-9 pm is bedtime. He is given his night medications between 7-8 pm, and usually watches tv until 9 pm, Grace suspects that patient is watching TV past bedtime.      Thought process and content are devoid of overt  "aberration suggestive of acute caty/psychosis. The patient denies SI/HI/AVH. There are changes on exam today compared to most recent evaluation.    - sleep: problematic-not sleeping  - appetite:   Recommend melatonin 1-3 mg po q HS as needed for sleep initiation. Reduce trazodone to 200 mg po HS. Will slowly reduce trazodone over time as I do believe this sleeplessness could be \"self-induced\" and out of defiant behavior. Made a list for Rakesh to use to help aide Grace in preparing for his bathing routine Rakesh is keen to get [employer] gift certificate at follow up should he have a good report come next month.   I have counseled the patient with regard to diagnoses and the recommended treatment regimen as documented below. Patient acknowledges the diagnoses per my rendered interpretation. Patient demonstrates understanding of potential risks/benefits/side effects associated with this regimen and is amenable to proceed in this fashion.       Social History     Socioeconomic History    Marital status: Single   Tobacco Use    Smoking status: Never     Passive exposure: Never    Smokeless tobacco: Never   Vaping Use    Vaping status: Never Used   Substance and Sexual Activity    Alcohol use: Never    Drug use: Never    Sexual activity: Defer       Tobacco use counseling/intervention: patient does not use tobacco.     Problem List:  Patient Active Problem List   Diagnosis    Seasonal allergies    Ceruminosis, bilateral    Dyspepsia    Murmur, heart    Mental impairment    Agitation    Nausea and vomiting    Constipation    Anxiety    Chronic superficial gastritis without bleeding    New onset seizure     Allergy:   No Known Allergies     Discontinued Medications:  There are no discontinued medications.    Current Medications:   Current Outpatient Medications   Medication Sig Dispense Refill    busPIRone (BUSPAR) 10 MG tablet Take 1 tablet by mouth 2 (Two) Times a Day. 180 tablet 1    docusate sodium (COLACE) 100 MG " capsule Take 1 capsule by mouth Every Night. 90 capsule 3    famotidine (Pepcid) 20 MG tablet Take 1 tablet by mouth every night at bedtime. 90 tablet 3    fluticasone (Flonase) 50 MCG/ACT nasal spray Administer 2 sprays into the nostril(s) as directed by provider Daily. 15.8 g 5    lactulose (CHRONULAC) 10 GM/15ML solution TAKE 30 ML BY MOUTH TWICE DAILY AS NEEDED FOR CONSTIPATION 150 mL 2    LORazepam (ATIVAN) 0.5 MG tablet Take 1 tablet by mouth Every 8 (Eight) Hours As Needed for Anxiety (and at first signs of anxiety attacks). 90 tablet 0    montelukast (SINGULAIR) 10 MG tablet TAKE 1 (ONE) TABLET EVERY NIGHT 90 tablet 2    Multiple Vitamins-Minerals (Multivitamin Gummies Mens) chewable tablet Chew 1 each Daily. 90 tablet 3    multivitamin with minerals tablet tablet Boy Stress Gummies - take 2 gummies daily as directed on bottle 180 each 3    ondansetron (ZOFRAN) 4 MG tablet Take 1 tablet by mouth Every 8 (Eight) Hours As Needed for Nausea or Vomiting. 60 tablet 5    polyethylene glycol (ClearLax) 17 GM/SCOOP powder Give 1 dose (17 g) every EVERY 48 HOURS AS NEEDED IF NO BOWEL MOVEMENT. 510 g 5    psyllium (Metamucil Smooth Texture) 58.6 % powder Take  by mouth Take As Directed. Take 2 teaspoons daily. 660 g 12    risperiDONE (RisperDAL) 1 MG tablet Take 0.5 tablets by mouth 2 (Two) Times a Day for 90 days. 30 tablet 2    sertraline (ZOLOFT) 100 MG tablet TAKE 1 (ONE) TABLET BY MOUTH DAILY 90 tablet 3    traZODone (DESYREL) 100 MG tablet Take 3 tablets by mouth Every Night for 90 days. 90 tablet 2    Vitamins A & D (vitamin A & D) ointment Apply 1 application topically to the appropriate area as directed As Needed (skin irritation). 113 g 11     No current facility-administered medications for this visit.     Past Medical History:  Past Medical History:   Diagnosis Date    Allergic     Anxiety     Incontinent of feces     Low back pain     Mental impairment     Nausea and vomiting 05/31/2023    [facility] cord       Past Surgical History:  Past Surgical History:   Procedure Laterality Date    BACK SURGERY      COLONOSCOPY  2003    COLONOSCOPY N/A 07/05/2023    Procedure: COLONOSCOPY TO TRANSVERSE WITH DISIMPACTION;  Surgeon: Rusty Baker MD;  Location: [facility] ENDOSCOPY;  Service: Gastroenterology;  Laterality: N/A;  FECAL IMPACTION, CONSTIPATION    ENDOSCOPY N/A 8/18/2023    Procedure: ESOPHAGOGASTRODUODENOSCOPY with biopsies;  Surgeon: Rusty Baker MD;  Location: [facility] ENDOSCOPY;  Service: Gastroenterology;  Laterality: N/A;  pre- nausea & vomiting  post- mild esopohagitis, mild gastritits       MENTAL STATUS EXAM   General Appearance:  Cleanly groomed and dressed and well developed  Eye Contact:  Good eye contact  Attitude:  Cooperative and candid  Motor Activity:  Fidgety  Muscle Strength:  Normal  Speech:  Normal rate, tone, volume  Language:  Spontaneous  Mood and affect:  Normal, pleasant  Hopelessness:  Denies  Loneliness: Denies  Thought Process:  Logical and goal-directed  Associations/ Thought Content:  No delusions  Hallucinations:  None  Suicidal Ideations:  Not present  Homicidal Ideation:  Not present  Sensorium:  Alert and clear  Orientation:  Person, place, time and situation  Immediate Recall, Recent, and Remote Memory:  Intact  Attention Span/ Concentration:  Easily distracted  Fund of Knowledge:  Appropriate for age and educational level  Intellectual Functioning:  Average range  Insight:  Limited  Judgement:  Limited  Reliability:  Fair  Impulse Control:  Poor      Vital Signs:   There were no vitals taken for this visit.   Lab Results:   Lab on 11/08/2024   Component Date Value Ref Range Status    Glucose 11/08/2024 131 (H)  65 - 99 mg/dL Final    BUN 11/08/2024 10  6 - 20 mg/dL Final    Creatinine 11/08/2024 0.91  0.76 - 1.27 mg/dL Final    Sodium 11/08/2024 136  136 - 145 mmol/L Final    Potassium 11/08/2024 3.8  3.5 - 5.2 mmol/L Final    Chloride 11/08/2024 101  98 - 107 mmol/L  Final    CO2 11/08/2024 21.0 (L)  22.0 - 29.0 mmol/L Final    Calcium 11/08/2024 9.0  8.6 - 10.5 mg/dL Final    BUN/Creatinine Ratio 11/08/2024 11.0  7.0 - 25.0 Final    Anion Gap 11/08/2024 14.0  5.0 - 15.0 mmol/L Final    eGFR 11/08/2024 114.1  >60.0 mL/min/1.73 Final    TSH 11/08/2024 1.050  0.270 - 4.200 uIU/mL Final    T Uptake 11/08/2024 1.09  0.80 - 1.30 TBI Final    T4, Total 11/08/2024 5.11  4.50 - 11.70 mcg/dL Final    T4 results may be falsely increased if patient taking Biotin.    WBC 11/08/2024 5.08  3.40 - 10.80 10*3/mm3 Final    RBC 11/08/2024 4.71  4.14 - 5.80 10*6/mm3 Final    Hemoglobin 11/08/2024 14.5  13.0 - 17.7 g/dL Final    Hematocrit 11/08/2024 42.8  37.5 - 51.0 % Final    MCV 11/08/2024 90.9  79.0 - 97.0 fL Final    MCH 11/08/2024 30.8  26.6 - 33.0 pg Final    MCHC 11/08/2024 33.9  31.5 - 35.7 g/dL Final    RDW 11/08/2024 12.0 (L)  12.3 - 15.4 % Final    RDW-SD 11/08/2024 39.7  37.0 - 54.0 fl Final    MPV 11/08/2024 9.3  6.0 - 12.0 fL Final    Platelets 11/08/2024 238  140 - 450 10*3/mm3 Final    Neutrophil % 11/08/2024 62.2  42.7 - 76.0 % Final    Lymphocyte % 11/08/2024 25.8  19.6 - 45.3 % Final    Monocyte % 11/08/2024 9.4  5.0 - 12.0 % Final    Eosinophil % 11/08/2024 1.4  0.3 - 6.2 % Final    Basophil % 11/08/2024 0.6  0.0 - 1.5 % Final    Immature Grans % 11/08/2024 0.6 (H)  0.0 - 0.5 % Final    Neutrophils, Absolute 11/08/2024 3.16  1.70 - 7.00 10*3/mm3 Final    Lymphocytes, Absolute 11/08/2024 1.31  0.70 - 3.10 10*3/mm3 Final    Monocytes, Absolute 11/08/2024 0.48  0.10 - 0.90 10*3/mm3 Final    Eosinophils, Absolute 11/08/2024 0.07  0.00 - 0.40 10*3/mm3 Final    Basophils, Absolute 11/08/2024 0.03  0.00 - 0.20 10*3/mm3 Final    Immature Grans, Absolute 11/08/2024 0.03  0.00 - 0.05 10*3/mm3 Final    nRBC 11/08/2024 0.0  0.0 - 0.2 /100 WBC Final       ASSESSMENT AND PLAN:  No diagnosis found.    Rakesh is a 34 y.o. male who presents today for follow-up regarding medication adjustments.  We have discussed the interval history and the treatment plan below, including potential R/B/SE of the recommended regimen of which the patient demonstrates understanding. Patient is agreeable to call 911 or go to the nearest ER should he become concerned for his own safety and/or the safety of those around him. There are are no overt indices of acute caty/psychosis on exam today. VALE reviewed and is as expected.    Medication regimen:   Add otc melatonin 5 mg po q HS for sleep   lorazepam 0.5 mg po prn for anxiety   buspirone 10 mg po BID  -risperidone 0.5 mg po BID  Sertraline 100 mg po q day  Reduce Trazodone to 200 mg po q HS; patient is advised not to misuse prescribed medications or to use them with any exogenous substances that aren't disclosed to this provider as they may interact with the regimen to the patient's detriment.   Risk Assessment: protracted risk is moderate, imminent risk is moderate. Do note that this is subject to change with the Evangel[employer] of new stressors, treatment non-adherence, use of substances, and/or new medical ails.   Monitoring: reviewed labs/imaging as populated above; ordered  Therapy: per community programming  Follow-up: 1 month  Communications: N/A    TREATMENT PLAN/GOALS: challenge patterns of living conducive to symptom burden, implement recommended regimen as above with augmentative, intermittent supportive psychotherapy to reduce symptom burden. Patient acknowledged and verbally consented to continue treatment. The importance of adherence to the recommended treatment and interval follow-up appointments was again emphasized today: patient has fair treatment adherence per given history. Patient was today reminded to limit daily caffeine intake, hydrate appropriately, eat healthy and nutritious foods, engage sleep hygiene measures, engage appropriate exposure to sunlight, engage with hobbies in balance with life necessities, and exercise appropriate to their capacity to do  so.       Parts of this note are electronic transcriptions/translations of spoken language to printed text using the Dragon Dictation system.    Electronically signed by JP Cadet, 12/13/24

## 2024-12-16 ENCOUNTER — TELEPHONE (OUTPATIENT)
Dept: GASTROENTEROLOGY | Facility: CLINIC | Age: 34
End: 2024-12-16
Payer: MEDICAID

## 2024-12-16 NOTE — TELEPHONE ENCOUNTER
Miralax script completed and faxed to [facility] at [phone] and fax confirmation received and scanned under media tab.

## 2024-12-19 ENCOUNTER — OFFICE VISIT (OUTPATIENT)
Dept: FAMILY MEDICINE CLINIC | Facility: CLINIC | Age: 34
End: 2024-12-19
Payer: MEDICAID

## 2024-12-19 VITALS
SYSTOLIC BLOOD PRESSURE: 110 MMHG | BODY MASS INDEX: 29.19 KG/M2 | OXYGEN SATURATION: 99 % | HEART RATE: 62 BPM | DIASTOLIC BLOOD PRESSURE: 70 MMHG | WEIGHT: 171 LBS | HEIGHT: 64 IN | TEMPERATURE: 98 F

## 2024-12-19 DIAGNOSIS — Z00.00 ANNUAL PHYSICAL EXAM: Primary | ICD-10-CM

## 2024-12-19 DIAGNOSIS — K29.30 CHRONIC SUPERFICIAL GASTRITIS WITHOUT BLEEDING: ICD-10-CM

## 2024-12-19 DIAGNOSIS — K59.00 CONSTIPATION, UNSPECIFIED CONSTIPATION TYPE: ICD-10-CM

## 2024-12-19 RX ORDER — IBUPROFEN 200 MG
200 TABLET ORAL EVERY 6 HOURS PRN
Qty: 120 TABLET | Refills: 5 | Status: SHIPPED | OUTPATIENT
Start: 2024-12-19

## 2024-12-19 RX ORDER — FAMOTIDINE 20 MG/1
20 TABLET, FILM COATED ORAL
Qty: 90 TABLET | Refills: 3 | Status: SHIPPED | OUTPATIENT
Start: 2024-12-19

## 2024-12-19 RX ORDER — DOCUSATE SODIUM 100 MG/1
100 CAPSULE, LIQUID FILLED ORAL NIGHTLY
Qty: 90 CAPSULE | Refills: 3 | Status: SHIPPED | OUTPATIENT
Start: 2024-12-19

## 2024-12-19 NOTE — PROGRESS NOTES
Chief Complaint  Annual Exam (Annual physical)    Subjective        Rakesh Mitchell presents to [facility]  History of Present Illness  Mr. Mitchell presents today accompanied by his care giver and is here for an annual physical exam with lab review.       I have reviewed the patient's medical history in detail and updated the computerized patient record.       Current Outpatient Medications:     busPIRone (BUSPAR) 10 MG tablet, Take 1 tablet by mouth 2 (Two) Times a Day., Disp: 180 tablet, Rfl: 1    docusate sodium (COLACE) 100 MG capsule, Take 1 capsule by mouth Every Night., Disp: 90 capsule, Rfl: 3    famotidine (Pepcid) 20 MG tablet, Take 1 tablet by mouth every night at bedtime., Disp: 90 tablet, Rfl: 3    LORazepam (ATIVAN) 0.5 MG tablet, Take 1 tablet by mouth Every 8 (Eight) Hours As Needed for Anxiety (and at first signs of anxiety attacks)., Disp: 90 tablet, Rfl: 0    melatonin 5 MG sublingual tablet sublingual tablet, Place  under the tongue., Disp: , Rfl:     montelukast (SINGULAIR) 10 MG tablet, TAKE 1 (ONE) TABLET EVERY NIGHT, Disp: 90 tablet, Rfl: 2    multivitamin with minerals tablet tablet, Boy Stress Gummies - take 2 gummies daily as directed on bottle, Disp: 180 each, Rfl: 3    ondansetron (ZOFRAN) 4 MG tablet, Take 1 tablet by mouth Every 8 (Eight) Hours As Needed for Nausea or Vomiting., Disp: 60 tablet, Rfl: 5    polyethylene glycol (ClearLax) 17 GM/SCOOP powder, Give 1 dose (17 g) every EVERY 48 HOURS AS NEEDED IF NO BOWEL MOVEMENT., Disp: 510 g, Rfl: 5    psyllium (Metamucil Smooth Texture) 58.6 % powder, Take  by mouth Take As Directed. Take 2 teaspoons daily., Disp: 660 g, Rfl: 12    risperiDONE (RisperDAL) 1 MG tablet, Take 0.5 tablets by mouth 2 (Two) Times a Day for 90 days., Disp: 30 tablet, Rfl: 2    sertraline (ZOLOFT) 100 MG tablet, TAKE 1 (ONE) TABLET BY MOUTH DAILY, Disp: 90 tablet, Rfl: 3    traZODone (DESYREL) 100 MG tablet, Take 2 tablets by  "mouth Every Night for 135 days., Disp: 90 tablet, Rfl: 2    Vitamins A & D (vitamin A & D) ointment, Apply 1 application topically to the appropriate area as directed As Needed (skin irritation)., Disp: 113 g, Rfl: 11    carbamide peroxide (Debrox) 6.5 % otic solution, Administer 5 drops into both ears As Needed for Ear Pain (ear wax impaction)., Disp: 15 mL, Rfl: 1    ibuprofen (Advil) 200 MG tablet, Take 1 tablet by mouth Every 6 (Six) Hours As Needed for Mild Pain, Headache or Fever., Disp: 120 tablet, Rfl: 5       Review of Systems   HENT: Negative.     Eyes: Negative.    Respiratory: Negative.     Cardiovascular: Negative.    Gastrointestinal: Negative.    Genitourinary: Negative.    Musculoskeletal: Negative.    Skin: Negative.    Neurological: Negative.    Psychiatric/Behavioral:  The patient is nervous/anxious.       Objective   Vital Signs:  /70 (BP Location: Right arm, Patient Position: Sitting, Cuff Size: Adult)   Pulse 62   Temp 98 °F (36.7 °C) (Temporal)   Ht 162.6 cm (64.02\")   Wt 77.6 kg (171 lb)   SpO2 99%   BMI 29.34 kg/m²   Estimated body mass index is 29.34 kg/m² as calculated from the following:    Height as of this encounter: 162.6 cm (64.02\").    Weight as of this encounter: 77.6 kg (171 lb).          Physical Exam  Constitutional:       Appearance: Normal appearance.   HENT:      Right Ear: There is impacted cerumen.      Left Ear: There is impacted cerumen.   Cardiovascular:      Rate and Rhythm: Normal rate and regular rhythm.      Pulses: Normal pulses.      Heart sounds: Normal heart sounds.   Pulmonary:      Effort: Pulmonary effort is normal.      Breath sounds: Normal breath sounds.   Abdominal:      General: Bowel sounds are normal.      Palpations: Abdomen is soft.   Skin:     General: Skin is warm and dry.   Neurological:      Mental Status: He is alert and oriented to person, place, and time.   Psychiatric:      Comments: No acute distress        Result Review " :    Common labs          11/8/2024    08:51   Common Labs   Glucose 131    BUN 10    Creatinine 0.91    Sodium 136    Potassium 3.8    Chloride 101    Calcium 9.0    WBC 5.08    Hemoglobin 14.5    Hematocrit 42.8    Platelets 238                Assessment and Plan   Diagnoses and all orders for this visit:    1. Annual physical exam (Primary)    2. Chronic superficial gastritis without bleeding  -     famotidine (Pepcid) 20 MG tablet; Take 1 tablet by mouth every night at bedtime.  Dispense: 90 tablet; Refill: 3    3. Constipation, unspecified constipation type  Comments:  controlled refill provided.  Orders:  -     docusate sodium (COLACE) 100 MG capsule; Take 1 capsule by mouth Every Night.  Dispense: 90 capsule; Refill: 3    Other orders  -     carbamide peroxide (Debrox) 6.5 % otic solution; Administer 5 drops into both ears As Needed for Ear Pain (ear wax impaction).  Dispense: 15 mL; Refill: 1  -     ibuprofen (Advil) 200 MG tablet; Take 1 tablet by mouth Every 6 (Six) Hours As Needed for Mild Pain, Headache or Fever.  Dispense: 120 tablet; Refill: 5    Mr. Mitchell appears to be doing well today.  His physical exam is unremarkable.  His labs are good.  We have discussed age related healthy behaviors.  I have refilled his medications as noted above.           Follow Up   Return in about 1 year (around 12/19/2025), or if symptoms worsen or fail to improve, for Annual physical, Fasting labs 1 week prior to next f/u.  Patient was given instructions and counseling regarding his condition or for health maintenance advice. Please see specific information pulled into the AVS if appropriate.

## 2024-12-20 ENCOUNTER — PATIENT ROUNDING (BHMG ONLY) (OUTPATIENT)
Dept: FAMILY MEDICINE CLINIC | Facility: CLINIC | Age: 34
End: 2024-12-20
Payer: MEDICAID

## 2025-01-02 DIAGNOSIS — F51.04 INSOMNIA, PSYCHOPHYSIOLOGICAL: Primary | ICD-10-CM

## 2025-01-02 NOTE — TELEPHONE ENCOUNTER
PT(PATIENT) VERIFIED     [facility] NEEDS A PRESCRIPTION FOR RECOMMENDED MELATONIN    melatonin 5 MG sublingual tablet sublingual tablet     SCRIPT NEEDS TO BE SENT TO [facility]

## 2025-01-10 DIAGNOSIS — J30.2 OTHER SEASONAL ALLERGIC RHINITIS: ICD-10-CM

## 2025-01-13 RX ORDER — MONTELUKAST SODIUM 10 MG/1
TABLET ORAL
Qty: 90 TABLET | Refills: 2 | OUTPATIENT
Start: 2025-01-13

## 2025-01-14 DIAGNOSIS — J30.2 OTHER SEASONAL ALLERGIC RHINITIS: ICD-10-CM

## 2025-01-14 RX ORDER — MONTELUKAST SODIUM 10 MG/1
TABLET ORAL
Qty: 90 TABLET | Refills: 2 | OUTPATIENT
Start: 2025-01-14

## 2025-01-15 DIAGNOSIS — J30.2 OTHER SEASONAL ALLERGIC RHINITIS: ICD-10-CM

## 2025-01-15 RX ORDER — MONTELUKAST SODIUM 10 MG/1
TABLET ORAL
Qty: 90 TABLET | Refills: 2 | OUTPATIENT
Start: 2025-01-15

## 2025-01-16 DIAGNOSIS — J30.2 OTHER SEASONAL ALLERGIC RHINITIS: ICD-10-CM

## 2025-01-16 RX ORDER — MONTELUKAST SODIUM 10 MG/1
10 TABLET ORAL NIGHTLY
Qty: 90 TABLET | Refills: 3 | Status: SHIPPED | OUTPATIENT
Start: 2025-01-16

## 2025-01-20 ENCOUNTER — HOSPITAL ENCOUNTER (OUTPATIENT)
Dept: MRI IMAGING | Facility: HOSPITAL | Age: 35
Discharge: HOME | End: 2025-01-20
Admitting: PSYCHIATRY & NEUROLOGY
Payer: MEDICAID

## 2025-01-20 DIAGNOSIS — R56.9 NEW ONSET SEIZURE: ICD-10-CM

## 2025-01-20 PROCEDURE — A9577 INJ MULTIHANCE: HCPCS | Performed by: PSYCHIATRY & NEUROLOGY

## 2025-01-20 PROCEDURE — 70553 MRI BRAIN STEM W/O & W/DYE: CPT

## 2025-01-20 PROCEDURE — 25510000002 GADOBENATE DIMEGLUMINE 529 MG/ML SOLUTION: Performed by: PSYCHIATRY & NEUROLOGY

## 2025-01-20 RX ADMIN — GADOBENATE DIMEGLUMINE 15 ML: 529 INJECTION, SOLUTION INTRAVENOUS at 08:50

## 2025-01-21 NOTE — PROGRESS NOTES
"[facility]  Follow-up Visit    Chief Complaint: \"he is here for a psychiatric evaluation\" \"I wanted to change and move away from [place]\"      History of Present Illness: Rakesh Mitchell is a smiling 34 y.o. male who presents today for initial evaluation regarding  . Rakesh presents accompanied with Grace Estrella (family home provider) in no acute distress and engages with me appropriately. Psychotropic regimen with which patient presents is described as lorazepam 0.5 mg po q 8 hours as needed for anxiety. Trazodone 100 mg po q HS, and sertraline 100 mg po q day, and buspirone 10 mg po BID. He uses CBD oil and Boy's stress gummies that patient pays out of pocket for and wishes to stop both.      Rakesh alternating symptoms of elevated (irritability and agitation) and depressed moods that do not conform to temporal parameters of bipolar disorder and are not sufficiently encapsulated by MDD. Grace shares that Rakesh becomes more introverted and withdrawn following periods of agitation.       History is positive for signs/symptoms suggestive of panic disorder-recurrent episodes of intense, unprovoked anxiety with chest pain, feelings of doom, dizziness, paresthesias, SOB, and associated avoidance 2/2 fear of recurrence of panic reports overstimulation with noises, changes in schedule, and interruptions within his day. Patient endorses that he picks his arms.      Recent and past history of significant trauma for which there are related intrusion symptoms related to the traumatic event (APS and \"re homing\" of patient due to questionable care by previous in home caregiver, also was homeless for a time prior) distressing memories, flashbacks, nightmares, intense distress associated with triggering stimuli, marked physiological reactions to triggering stimuli), persistent avoidance of triggering stimuli, negative alterations in cognition and mood (memory lapses, negative schemas, " distorted cognitions about the event, social withdrawal, feelings of detachment/estrangement, persistent anhedonia), and marked alterations in arousal and reactivity (irritability, reckless behavior, hypervigilance, exaggerated startle, sleep disturbances). Grace voices that his distress tolerance has been lowered and she notices that he is on edge.      Psychiatric screening is negative for pathognomonic history of: TBI, psychosis, violence, and suicidality.     Increase trazodone 100 mg po q HS to 200 mg po for week, then increase 300 mg po q HS. Begin risperidone 0.5 mg po BID. I will prescribe risperidone  for behaviors/aggression. Patient has been advised that this agent has propensity to contribute to EPS (particularly dystonia, tremor, akathisia, and TD), weight gain, dyslipidemia, hyperglycemia, reduced arousal, and somnolence. Additionally, risks regarding DRESS, NMS, and diminished seizure threshold have been reviewed today. I have counseled the patient with regard to diagnoses and the recommended treatment regimen as documented below: I will assume prescriptive responsibility for lorazapam 0.5 mg po q 8 hours as needed for anxiety-will work to discontinue, continue sertraline 100 mg po q day, and buspirone 10 mg po BID. Will look for ways to minimize/simplify medication regime and mitigate risks associated with psychiatric polypharmacy. Patient acknowledges the diagnoses per my rendered interpretation. Patient demonstrates awareness/understanding of viable alternatives for treatment as well as potential risks, benefits, and side effects associated with this regimen and is amenable to proceed in this fashion.      Recommended lifestyle changes: consider joining special [employer] for group activities  Psychiatric History:  Diagnoses: mood disorder, mild intellectual disability, ASD?  Outpatient history: [facility]  Inpatient history: none  Medication trials: sertraline,   Other treatment modalities: no  Self  "harm: skin picking  Suicide attempts: No  Abuse or neglect: questionable with past home care provider     Substance Abuse History:   Types/methods/frequency: *none  Transtheoretical stage: none     Social History:  Residence: lives with Grace Estrella  Vocation: no  Source of income: Social Security  Last grade completed: special education  Pertinent developmental history: IEP during school  Pertinent legal history: No history   Hobbies/interests: not disclosed  Jew: N/A  Exercise:walking  Dietary habits: no pertinent issues   Sleep hygiene: uses trazodone for sleep, problematic at times due to anxiety  Social habits: no pertinent issues   Sunlight: There are no concern for under-exposure.  Caffeine intake: no pertinent issues   Hydration habits: no pertinent issues    history: No       Interval History Rakesh is a 34 y.o. male who presents today for follow-up. Rakesh presents unaccompanied in no acute distress and engages with me appropriately.   Current treatment regimen includes:   - buspirone 10 mg po BID  -lorazepam 0.5 mg q 8 hours as needed (Grace reports that this medication is rather ineffective)  -risperidone 0.5  mg po BID  Sertraline 100 mg po q day  Trazodone 100 mg, two tabs at HS  Melatonin 5 mg po q HS sublingual   Side-effects per given history: none.      Today the patient feels frustrated. Rakesh is still making himself vomiting. Grace reports that he tends to \"self sabotage.\" Grace reports at least 3 instances involving multiple self-induced episodes throughout the day. \"Rakesh self-sabotages and does not see he the harm in it.\"   Thought process and content are devoid of overt aberration suggestive of acute caty/psychosis. The patient denies SI/HI/AVH. There are changes on exam today compared to most recent evaluation.    - sleep: problematic, still stays up late but Grace reports  - appetite: moderately controlled  Spoke with Rakesh about the dangers of self-induced vomiting. " Patient does not appear to comprehend. Will look for ways to reduce medication list. Will consider stopping lorazepam, and offering hydroxyzine 10 mg po TID prn    I have counseled the patient with regard to diagnoses and the recommended treatment regimen as documented below. Patient acknowledges the diagnoses per my rendered interpretation. Patient demonstrates understanding of potential risks/benefits/side effects associated with this regimen and is amenable to proceed in this fashion.       Social History     Socioeconomic History    Marital status: Single   Tobacco Use    Smoking status: Never     Passive exposure: Never    Smokeless tobacco: Never   Vaping Use    Vaping status: Never Used   Substance and Sexual Activity    Alcohol use: Never    Drug use: Never    Sexual activity: Defer       Tobacco use counseling/intervention: patient does not use tobacco.   Problem List:  Patient Active Problem List   Diagnosis    Seasonal allergies    Ceruminosis, bilateral    Dyspepsia    Murmur, heart    Mental impairment    Agitation    Nausea and vomiting    Constipation    Anxiety    Chronic superficial gastritis without bleeding    New onset seizure     Allergy:   No Known Allergies     Discontinued Medications:  Medications Discontinued During This Encounter   Medication Reason    traZODone (DESYREL) 100 MG tablet Dose adjustment       Current Medications:   Current Outpatient Medications   Medication Sig Dispense Refill    busPIRone (BUSPAR) 10 MG tablet Take 1 tablet by mouth 2 (Two) Times a Day. 180 tablet 1    carbamide peroxide (Debrox) 6.5 % otic solution Administer 5 drops into both ears As Needed for Ear Pain (ear wax impaction). 15 mL 1    docusate sodium (COLACE) 100 MG capsule Take 1 capsule by mouth Every Night. 90 capsule 3    famotidine (Pepcid) 20 MG tablet Take 1 tablet by mouth every night at bedtime. 90 tablet 3    ibuprofen (Advil) 200 MG tablet Take 1 tablet by mouth Every 6 (Six) Hours As Needed  for Mild Pain, Headache or Fever. 120 tablet 5    LORazepam (ATIVAN) 0.5 MG tablet Take 1 tablet by mouth Every 8 (Eight) Hours As Needed for Anxiety (and at first signs of anxiety attacks). 90 tablet 0    melatonin 5 MG sublingual tablet sublingual tablet Place 1 tablet under the tongue Every Night for 90 days. 30 tablet 2    montelukast (SINGULAIR) 10 MG tablet Take 1 tablet by mouth Every Night. 90 tablet 3    multivitamin with minerals tablet tablet Boy Stress Gummies - take 2 gummies daily as directed on bottle 180 each 3    ondansetron (ZOFRAN) 4 MG tablet Take 1 tablet by mouth Every 8 (Eight) Hours As Needed for Nausea or Vomiting. 60 tablet 5    polyethylene glycol (ClearLax) 17 GM/SCOOP powder Give 1 dose (17 g) every EVERY 48 HOURS AS NEEDED IF NO BOWEL MOVEMENT. 510 g 5    psyllium (Metamucil Smooth Texture) 58.6 % powder Take  by mouth Take As Directed. Take 2 teaspoons daily. 660 g 12    risperiDONE (RisperDAL) 1 MG tablet Take 0.5 tablets by mouth 2 (Two) Times a Day for 90 days. 30 tablet 2    sertraline (ZOLOFT) 100 MG tablet TAKE 1 (ONE) TABLET BY MOUTH DAILY 90 tablet 3    Vitamins A & D (vitamin A & D) ointment Apply 1 application topically to the appropriate area as directed As Needed (skin irritation). 113 g 11    fluticasone (FLONASE) 50 MCG/ACT nasal spray USE 2 (TWO) SPRAYS IN EACH NOSTRIL AS DIRECTED BY PROVIDER DAILY (Patient not taking: Reported on 1/22/2025)      traZODone (DESYREL) 100 MG tablet Take 1 tablet by mouth Every Night for 60 days. 30 tablet 1     No current facility-administered medications for this visit.     Past Medical History:  Past Medical History:   Diagnosis Date    Allergic     Anxiety     Incontinent of feces     Low back pain     Mental impairment     Nausea and vomiting 05/31/2023    Tethered cord      Past Surgical History:  Past Surgical History:   Procedure Laterality Date    BACK SURGERY      COLONOSCOPY  2003    COLONOSCOPY N/A 07/05/2023    Procedure:  "COLONOSCOPY TO TRANSVERSE WITH DISIMPACTION;  Surgeon: Rusty Baker MD;  Location: [facility] ENDOSCOPY;  Service: Gastroenterology;  Laterality: N/A;  FECAL IMPACTION, CONSTIPATION    ENDOSCOPY N/A 8/18/2023    Procedure: ESOPHAGOGASTRODUODENOSCOPY with biopsies;  Surgeon: Rusty Baker MD;  Location: [facility] ENDOSCOPY;  Service: Gastroenterology;  Laterality: N/A;  pre- nausea & vomiting  post- mild esopohagitis, mild gastritits       MENTAL STATUS EXAM   General Appearance:  Cleanly groomed and dressed and well developed  Eye Contact:  Poor eye contact and downcast  Attitude:  Polite  Motor Activity:  Normal gait, posture  Muscle Strength:  Normal  Speech:  Monotone  Language:  Spontaneous  Mood and affect:  Normal, pleasant  Thought Process:  Linear  Associations/ Thought Content:  No delusions  Hallucinations:  None  Suicidal Ideations:  Not present  Homicidal Ideation:  Not present  Sensorium:  Alert  Orientation:  Person, place, time and situation  Immediate Recall, Recent, and Remote Memory:  Intact  Attention Span/ Concentration:  Easily distracted  Fund of Knowledge:  Limited  Intellectual Functioning:  Previous IDD dx  Insight:  Limited  Judgement:  Limited  Reliability:  Fair  Impulse Control:  Fair      Vital Signs:   /58   Pulse 83   Ht 160 cm (63\")   Wt 75.8 kg (167 lb)   BMI 29.58 kg/m²    Lab Results:   Lab on 11/08/2024   Component Date Value Ref Range Status    Glucose 11/08/2024 131 (H)  65 - 99 mg/dL Final    BUN 11/08/2024 10  6 - 20 mg/dL Final    Creatinine 11/08/2024 0.91  0.76 - 1.27 mg/dL Final    Sodium 11/08/2024 136  136 - 145 mmol/L Final    Potassium 11/08/2024 3.8  3.5 - 5.2 mmol/L Final    Chloride 11/08/2024 101  98 - 107 mmol/L Final    CO2 11/08/2024 21.0 (L)  22.0 - 29.0 mmol/L Final    Calcium 11/08/2024 9.0  8.6 - 10.5 mg/dL Final    BUN/Creatinine Ratio 11/08/2024 11.0  7.0 - 25.0 Final    Anion Gap 11/08/2024 14.0  5.0 - 15.0 mmol/L Final    eGFR " 11/08/2024 114.1  >60.0 mL/min/1.73 Final    TSH 11/08/2024 1.050  0.270 - 4.200 uIU/mL Final    T Uptake 11/08/2024 1.09  0.80 - 1.30 TBI Final    T4, Total 11/08/2024 5.11  4.50 - 11.70 mcg/dL Final    T4 results may be falsely increased if patient taking Biotin.    WBC 11/08/2024 5.08  3.40 - 10.80 10*3/mm3 Final    RBC 11/08/2024 4.71  4.14 - 5.80 10*6/mm3 Final    Hemoglobin 11/08/2024 14.5  13.0 - 17.7 g/dL Final    Hematocrit 11/08/2024 42.8  37.5 - 51.0 % Final    MCV 11/08/2024 90.9  79.0 - 97.0 fL Final    MCH 11/08/2024 30.8  26.6 - 33.0 pg Final    MCHC 11/08/2024 33.9  31.5 - 35.7 g/dL Final    RDW 11/08/2024 12.0 (L)  12.3 - 15.4 % Final    RDW-SD 11/08/2024 39.7  37.0 - 54.0 fl Final    MPV 11/08/2024 9.3  6.0 - 12.0 fL Final    Platelets 11/08/2024 238  140 - 450 10*3/mm3 Final    Neutrophil % 11/08/2024 62.2  42.7 - 76.0 % Final    Lymphocyte % 11/08/2024 25.8  19.6 - 45.3 % Final    Monocyte % 11/08/2024 9.4  5.0 - 12.0 % Final    Eosinophil % 11/08/2024 1.4  0.3 - 6.2 % Final    Basophil % 11/08/2024 0.6  0.0 - 1.5 % Final    Immature Grans % 11/08/2024 0.6 (H)  0.0 - 0.5 % Final    Neutrophils, Absolute 11/08/2024 3.16  1.70 - 7.00 10*3/mm3 Final    Lymphocytes, Absolute 11/08/2024 1.31  0.70 - 3.10 10*3/mm3 Final    Monocytes, Absolute 11/08/2024 0.48  0.10 - 0.90 10*3/mm3 Final    Eosinophils, Absolute 11/08/2024 0.07  0.00 - 0.40 10*3/mm3 Final    Basophils, Absolute 11/08/2024 0.03  0.00 - 0.20 10*3/mm3 Final    Immature Grans, Absolute 11/08/2024 0.03  0.00 - 0.05 10*3/mm3 Final    nRBC 11/08/2024 0.0  0.0 - 0.2 /100 WBC Final       ASSESSMENT AND PLAN:    ICD-10-CM ICD-9-CM   1. Insomnia, psychophysiological  F51.04 307.42   2. Mood disorder  F39 296.90   3. Aggression  R46.89 V40.39   4. Mild intellectual disability  F70 317   5. PTSD (post-traumatic stress disorder)  F43.10 309.81   6. Oppositional defiant behavior  R46.89 V40.39       Rakesh is a 34 y.o. male who presents today for  follow-up regarding medication management. We have discussed the interval history and the treatment plan below, including potential R/B/SE of the recommended regimen of which the patient demonstrates understanding. Patient is agreeable to call 911 or go to the nearest ER should he become concerned for his own safety and/or the safety of those around him. There are are no overt indices of acute caty/psychosis on exam today. VALE reviewed and is as expected.    Medication regimen  Reduce Trazodone 100 mg po q HS:   Continue buspirone 10 mg po BID  Continue -lorazepam 0.5 mg q 8 hours as needed for anxiety  Continue -risperidone 0.5  mg po BID  Continue Sertraline 100 mg po q day  Continue Melatonin 5 mg po q HS sublingual; patient is advised not to misuse prescribed medications or to use them with any exogenous substances that aren't disclosed to this provider as they may interact with the regimen to the patient's detriment.   Risk Assessment: protracted risk is moderate, imminent risk is moderate. Do note that this is subject to change with the Christian of new stressors, treatment non-adherence, use of substances, and/or new medical ails.   Monitoring: reviewed labs/imaging as populated above; ordered  Therapy: per community programming  Follow-up: 1 month  Communications: N/A    TREATMENT PLAN/GOALS: challenge patterns of living conducive to symptom burden, implement recommended regimen as above with augmentative, intermittent supportive psychotherapy to reduce symptom burden. Patient acknowledged and verbally consented to continue treatment. The importance of adherence to the recommended treatment and interval follow-up appointments was again emphasized today: patient has fair treatment adherence per given history. Patient was today reminded to limit daily caffeine intake, hydrate appropriately, eat healthy and nutritious foods, engage sleep hygiene measures, engage appropriate exposure to sunlight, engage with  hobbies in balance with life necessities, and exercise appropriate to their capacity to do so.       Parts of this note are electronic transcriptions/translations of spoken language to printed text using the Dragon Dictation system.    Electronically signed by JP Cadet, 01/21/25

## 2025-01-22 ENCOUNTER — OFFICE VISIT (OUTPATIENT)
Dept: PSYCHIATRY | Facility: CLINIC | Age: 35
End: 2025-01-22
Payer: MEDICAID

## 2025-01-22 VITALS
HEART RATE: 83 BPM | HEIGHT: 63 IN | DIASTOLIC BLOOD PRESSURE: 58 MMHG | BODY MASS INDEX: 29.59 KG/M2 | SYSTOLIC BLOOD PRESSURE: 113 MMHG | WEIGHT: 167 LBS

## 2025-01-22 DIAGNOSIS — R46.89 AGGRESSION: ICD-10-CM

## 2025-01-22 DIAGNOSIS — F43.10 PTSD (POST-TRAUMATIC STRESS DISORDER): ICD-10-CM

## 2025-01-22 DIAGNOSIS — R46.89 OPPOSITIONAL DEFIANT BEHAVIOR: ICD-10-CM

## 2025-01-22 DIAGNOSIS — F51.04 INSOMNIA, PSYCHOPHYSIOLOGICAL: Primary | ICD-10-CM

## 2025-01-22 DIAGNOSIS — F39 MOOD DISORDER: ICD-10-CM

## 2025-01-22 DIAGNOSIS — F70 MILD INTELLECTUAL DISABILITY: ICD-10-CM

## 2025-01-22 RX ORDER — TRAZODONE HYDROCHLORIDE 100 MG/1
100 TABLET ORAL NIGHTLY
Qty: 30 TABLET | Refills: 1 | Status: SHIPPED | OUTPATIENT
Start: 2025-01-22 | End: 2025-03-23

## 2025-01-22 RX ORDER — FLUTICASONE PROPIONATE 50 MCG
SPRAY, SUSPENSION (ML) NASAL
COMMUNITY
Start: 2025-01-10

## 2025-01-30 DIAGNOSIS — R46.89 AGGRESSION: ICD-10-CM

## 2025-01-30 RX ORDER — RISPERIDONE 1 MG/1
0.5 TABLET ORAL DAILY
Qty: 30 TABLET | Refills: 0 | Status: SHIPPED | OUTPATIENT
Start: 2025-01-30

## 2025-01-30 NOTE — TELEPHONE ENCOUNTER
REFILL REQUEST:     risperiDONE (RisperDAL) 1 MG tablet (10/30/2024)     F/UP- 02/24/2025.  LOV: 01/22/2025.

## 2025-02-14 ENCOUNTER — TELEPHONE (OUTPATIENT)
Dept: FAMILY MEDICINE CLINIC | Facility: CLINIC | Age: 35
End: 2025-02-14

## 2025-02-14 NOTE — TELEPHONE ENCOUNTER
Caller: SELENE VALDEZ    Relationship: Emergency Contact    Best call back number: [phone]     What orders are you requesting (i.e. lab or imaging): DISCONTINUE fluticasone (FLONASE) 50 MCG/ACT nasal spray     Additional notes: STATES THAT PATIENT HAD AN APPOINTMENT ON 12/19/24, AND PCP HAD ADVISED TO STOP MEDICATION, BUT THE FAMILY HOME WILL NEED AN ORDER DATED FROM 12/19/24 TO DISCONTINUE.    FAX [facility]: [phone] OCTAVIO ALEJANDRA

## 2025-02-14 NOTE — TELEPHONE ENCOUNTER
Caller: SELENE VALDEZ    Relationship: Emergency Contact    Best call back number: [phone]     What was the call regarding: REQUESTS A COPY OF SCRIPT FOR PATIENT'S busPIRone (BUSPAR) 10 MG tablet TO HAVE ON FILE WITH FAMILY HOME.      FAX [facility]: [phone] ATTENTION JUSTYN

## 2025-02-17 DIAGNOSIS — F41.9 ANXIETY: ICD-10-CM

## 2025-02-17 RX ORDER — BUSPIRONE HYDROCHLORIDE 10 MG/1
10 TABLET ORAL 2 TIMES DAILY
Qty: 180 TABLET | Refills: 1 | Status: SHIPPED | OUTPATIENT
Start: 2025-02-17

## 2025-02-17 NOTE — TELEPHONE ENCOUNTER
I have printed a prescription. Please inform the caregiver that she needs to request a written prescription at the time of the office visit.

## 2025-02-26 ENCOUNTER — TELEPHONE (OUTPATIENT)
Dept: PSYCHIATRY | Facility: CLINIC | Age: 35
End: 2025-02-26
Payer: MEDICAID

## 2025-02-26 NOTE — TELEPHONE ENCOUNTER
PTS CAREGIVER CALLED IN.    PT HAD ANOTHER SEIZURE TODAY AND WAS TAKEN TO THE HOSPITAL BY EMS.    THE PHYSICIAN AT THE HOSPITAL TOLD THEM TO CALL PROVIDER BECKY AND GET PROVIDER TO STOP THE TRAZODONE.     PTS CAREGIVER IS ALSO GOING TO NEED THE DISCONTINUATION OF THE MEDICATION IN WRITING AS SHE WORKS FOR AN AGENCY AND THEY REQUIRE WRITTEN DOCUMENTATION.

## 2025-02-26 NOTE — TELEPHONE ENCOUNTER
Vania Estrada, APRN to [facility] Clinical Pool  1 hour ago  Acknowledged. Will stop trazodone. Please have guardian send hospital documents to me. So I have them on record.    I CALLED AND SPOKE WITH JER MORTON.     I RELAYED OF PROVIDERS MESSAGE VERBATIM.,    SHE WILL CALL US THE NEXT BUSINESS DAY BEFORE SHE BRINGS THE HOSPITAL DOCUMENTS TO FOLLOW UP ON THE STATUS OF THE LETTER.    WILL PUSH THIS OUT TO THE NEXT BUSINESS DAY TO FOLLOW UP ON.

## 2025-02-27 ENCOUNTER — TELEPHONE (OUTPATIENT)
Dept: NEUROLOGY | Facility: CLINIC | Age: 35
End: 2025-02-27

## 2025-02-27 DIAGNOSIS — R46.89 AGGRESSION: ICD-10-CM

## 2025-02-27 NOTE — TELEPHONE ENCOUNTER
risperidone 1 mg tablet     Last ordered: 4 weeks ago (1/30/2025) by Maxim Duggan MD     Follow up 04/04/2025

## 2025-02-27 NOTE — TELEPHONE ENCOUNTER
URGENT    Provider: DR. MONTOYA    Caller: SELENE VALDEZ    Relationship to Patient: Emergency Contact    Phone Number: [phone]    Reason for Call: PT'S CAREGIVER STATES PT HAD ANOTHER SEIZURE ON 02/26 AND WAS TAKEN FROM SCHOOL TO [facility], CAREGIVER STATES DR. MONTOYA ADVISED HER TO REPORT ANY SEIZURES TO HIM, PT HAD MRI 01/20, NEXT F/U CURRENTLY SCHEDULED FOR 09/08.    NOTE: [facility] PRESCRIBED KEPPRA AND ADVISED F/U W/NEURO ASAP.    When was the patient last seen: 12/09/024        PLEASE REVIEW AND ADVISE ASAP

## 2025-02-28 ENCOUNTER — DOCUMENTATION (OUTPATIENT)
Dept: PSYCHIATRY | Facility: CLINIC | Age: 35
End: 2025-02-28
Payer: MEDICAID

## 2025-02-28 ENCOUNTER — TELEPHONE (OUTPATIENT)
Dept: PSYCHIATRY | Facility: CLINIC | Age: 35
End: 2025-02-28
Payer: MEDICAID

## 2025-02-28 DIAGNOSIS — R46.89 AGGRESSION: Primary | ICD-10-CM

## 2025-02-28 RX ORDER — RISPERIDONE 1 MG/1
0.5 TABLET ORAL DAILY
Qty: 30 TABLET | Refills: 0 | Status: SHIPPED | OUTPATIENT
Start: 2025-02-28 | End: 2025-02-28

## 2025-02-28 RX ORDER — LEVETIRACETAM 500 MG/1
TABLET ORAL
Qty: 120 TABLET | Refills: 3 | Status: SHIPPED | OUTPATIENT
Start: 2025-02-28

## 2025-02-28 RX ORDER — RISPERIDONE 0.5 MG/1
TABLET ORAL
Qty: 60 TABLET | Refills: 1 | Status: SHIPPED | OUTPATIENT
Start: 2025-02-28

## 2025-02-28 NOTE — TELEPHONE ENCOUNTER
Patient's FHP called to clarify the patient's prescription.  She thought the patient was supposed to be taking risperdal 0.5mg BID.  The most recent script sent did not match that.  Please clarify

## 2025-03-14 DIAGNOSIS — F39 MOOD DISORDER: ICD-10-CM

## 2025-03-14 RX ORDER — TRAZODONE HYDROCHLORIDE 100 MG/1
TABLET ORAL
Qty: 30 TABLET | Refills: 1 | OUTPATIENT
Start: 2025-03-14

## 2025-03-25 DIAGNOSIS — F51.04 INSOMNIA, PSYCHOPHYSIOLOGICAL: ICD-10-CM

## 2025-03-25 NOTE — TELEPHONE ENCOUNTER
REFILL REQUEST:     melatonin 5 MG sublingual tablet sublingual tablet (01/02/2025)     F/UP- 04/04/2025.  LOV: 01/22/2025.

## 2025-03-26 ENCOUNTER — TELEPHONE (OUTPATIENT)
Dept: FAMILY MEDICINE CLINIC | Facility: CLINIC | Age: 35
End: 2025-03-26

## 2025-03-26 ENCOUNTER — TELEPHONE (OUTPATIENT)
Dept: NEUROLOGY | Facility: CLINIC | Age: 35
End: 2025-03-26

## 2025-03-26 ENCOUNTER — OFFICE VISIT (OUTPATIENT)
Dept: NEUROLOGY | Facility: CLINIC | Age: 35
End: 2025-03-26
Payer: MEDICAID

## 2025-03-26 VITALS
BODY MASS INDEX: 28.7 KG/M2 | HEIGHT: 63 IN | HEART RATE: 79 BPM | SYSTOLIC BLOOD PRESSURE: 117 MMHG | WEIGHT: 162 LBS | DIASTOLIC BLOOD PRESSURE: 65 MMHG

## 2025-03-26 DIAGNOSIS — G40.309 GENERALIZED TONIC CLONIC EPILEPSY: Primary | ICD-10-CM

## 2025-03-26 DIAGNOSIS — K31.89 FUNCTIONAL VOMITING: ICD-10-CM

## 2025-03-26 PROBLEM — R11.2 NAUSEA AND VOMITING: Status: RESOLVED | Noted: 2023-05-31 | Resolved: 2025-03-26

## 2025-03-26 PROBLEM — R56.9 NEW ONSET SEIZURE: Status: RESOLVED | Noted: 2024-12-09 | Resolved: 2025-03-26

## 2025-03-26 RX ORDER — LEVETIRACETAM 100 MG/ML
SOLUTION ORAL
Qty: 1 EACH | Refills: 6 | Status: SHIPPED | OUTPATIENT
Start: 2025-03-26

## 2025-03-26 RX ORDER — HYDROXYZINE HYDROCHLORIDE 50 MG/1
50 TABLET, FILM COATED ORAL EVERY 6 HOURS PRN
COMMUNITY

## 2025-03-26 NOTE — TELEPHONE ENCOUNTER
Caller: SELENE VALDEZ    Relationship to patient: Emergency Contact    Best call back number: [phone]    Patient is needing: PATIENT SEE'S [facility] MENTAL [facility]. CARE GIVER AND NEUROLOGIST WOULD LIKE FOR HIM TO SEE A PSYCHIATRIST AT [facility].  PLEASE CALL BACK TO DISCUSS

## 2025-03-26 NOTE — PROGRESS NOTES
Chief Complaint  Follow-up (Med check since starting Keppra. )    Wayne Mitchell is a 34 y.o. male who presents to [facility]  History of Present Illness      History of Present Illness  The patient presents for evaluation of seizures and vomiting.    He experienced a seizure on 02/26/2025, which was characterized by convulsions and loss of consciousness. The duration of the convulsions is unknown, but it was reported that he was difficult to rouse. An ambulance was called, and he was subsequently hospitalized. He has been taking Keppra since the day of the seizure, initially at a dose of 500 mg twice daily for 2 weeks, which was then increased to 1000 mg twice daily. He has been chewing his Keppra tablets, despite the bottle's instructions not to chew or crush them. He is inquiring about the availability of a liquid formulation of Keppra.    It is reported that he had stayed up all night prior to the seizure and had been inducing vomiting, a behavior he has exhibited throughout his life. The frequency of this self-induced vomiting is several times a month, sometimes persisting for a week. It is uncertain if there is any correlation between the vomiting and the seizures. The vomiting typically occurs when he experiences anxiety or wishes to avoid certain activities, often after staying up all night watching television. The vomiting episodes usually start in the morning and can continue into the night, occurring multiple times within a day. He has been in a program for 6 years and has been engaging in this behavior throughout this period. He has consulted a gastroenterologist, who suggested that the vomiting may be behavioral. He has undergone an upper endoscopy. He does not smoke cannabis and does not suffer from migraines. He has a behavior therapist who believes that the vomiting may have become a habitual behavior that he derives some pleasure from.  "He is currently on medication for anxiety.    SOCIAL HISTORY  He does not smoke.    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         Assessment and Plan  Diagnoses and all orders for this visit:    1. Generalized tonic clonic epilepsy (Primary)    2. Functional vomiting    Other orders  -     levETIRAcetam (KEPPRA) 100 MG/ML solution; Take 1000 mg BID.  Dispense: 1 each; Refill: 6         Assessment & Plan  1. Seizures.  The MRI results indicate a propensity for seizures, with no evidence of tumors or other abnormalities. The presence of mesial temporal sclerosis could potentially contribute to seizure activity. The seizures are not considered uncontrolled, as they have only occurred twice and were not associated with any medication regimen. A prescription for levetiracetam 100 mg per 5 mL solution, to be taken at a dosage of 1000 mg twice daily, will be provided. He is advised to continue with his current medication regimen and to contact us immediately in the event of another seizure.    2. Vomiting.  The vomiting episodes are likely related to anxiety and behavioral issues rather than neurological or gastrointestinal causes. The [place] IV criteria were discussed, and it was determined that his symptoms do not align with cyclic vomiting syndrome. Lifestyle modifications, including maintaining good sleep hygiene, were recommended to help manage the vomiting episodes. He is encouraged to seek cognitive behavioral therapy at a specialized center like [facility] or the [facility] for further management of his vomiting.      Total time spent with the patient and coordinating patient care was 40 minutes.    Follow " Up  No follow-ups on file.  Patient was given instructions and counseling regarding his condition or for health maintenance advice. Please see specific information pulled into the AVS if appropriate.     Patient or patient representative verbalized consent for the use of Ambient Listening during the visit with  Ashwin Cordero MD for chart documentation. 3/26/2025  09:56 EDT

## 2025-03-26 NOTE — TELEPHONE ENCOUNTER
Spoke with caregiver previously about this. If Neurology would like pt to be seen by a Psychiatrist, they will need to place the referral.
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
## 2025-04-04 ENCOUNTER — OFFICE VISIT (OUTPATIENT)
Dept: PSYCHIATRY | Facility: CLINIC | Age: 35
End: 2025-04-04
Payer: MEDICAID

## 2025-04-04 DIAGNOSIS — F51.04 INSOMNIA, PSYCHOPHYSIOLOGICAL: ICD-10-CM

## 2025-04-04 DIAGNOSIS — F39 MOOD DISORDER: Primary | ICD-10-CM

## 2025-04-04 DIAGNOSIS — R46.89 OPPOSITIONAL DEFIANT BEHAVIOR: ICD-10-CM

## 2025-04-04 DIAGNOSIS — R46.89 AGGRESSION: ICD-10-CM

## 2025-04-04 DIAGNOSIS — F70 MILD INTELLECTUAL DISABILITY: ICD-10-CM

## 2025-04-14 ENCOUNTER — TELEPHONE (OUTPATIENT)
Dept: FAMILY MEDICINE CLINIC | Facility: CLINIC | Age: 35
End: 2025-04-14
Payer: MEDICAID

## 2025-04-14 NOTE — TELEPHONE ENCOUNTER
Rakesh Savage's caregiver is wants an update for the following telephone call on 3-26-25 regarding referral request for [facility] psychiatrist: His neurologist advised he needs a face to face with them.  Please call Grace at [phone] to discuss further. Rakesh will be changing caregivers in the next couple weeks and she wants to wrap this up before he leaves her care.

## 2025-04-15 NOTE — TELEPHONE ENCOUNTER
Patients caregiver was informed to contact insurance and see if they would cover a Psychiatrist, and if so, to please find out which one. The other route was to have the Neurologist recommend someone, or place a referral. This was previously discussed several weeks ago.

## 2025-04-16 DIAGNOSIS — R46.89 AGGRESSION: ICD-10-CM

## 2025-04-16 RX ORDER — RISPERIDONE 0.5 MG/1
0.5 TABLET ORAL EVERY 12 HOURS SCHEDULED
Qty: 60 TABLET | Refills: 1 | Status: SHIPPED | OUTPATIENT
Start: 2025-04-16

## 2025-04-16 NOTE — TELEPHONE ENCOUNTER
risperidone 0.5 mg tablet   Last ordered: 1 month ago (2/28/2025) by JP Cadet     FOLLOW UP 05/15/2025

## 2025-04-23 DIAGNOSIS — F41.9 ANXIETY: ICD-10-CM

## 2025-04-25 DIAGNOSIS — F41.9 ANXIETY: Primary | ICD-10-CM

## 2025-04-25 DIAGNOSIS — F41.9 ANXIETY: ICD-10-CM

## 2025-04-25 RX ORDER — BUSPIRONE HYDROCHLORIDE 10 MG/1
10 TABLET ORAL 2 TIMES DAILY
Qty: 180 TABLET | Refills: 1 | OUTPATIENT
Start: 2025-04-25

## 2025-04-25 RX ORDER — BUSPIRONE HYDROCHLORIDE 10 MG/1
10 TABLET ORAL 3 TIMES DAILY
Qty: 30 TABLET | Refills: 5 | Status: SHIPPED | OUTPATIENT
Start: 2025-04-25

## 2025-04-25 NOTE — TELEPHONE ENCOUNTER
Refused buspirone. Medication was discontinued by Dr. Cordero on 3/26/2025. Reason patient reports he is not taking the medication.

## 2025-04-28 ENCOUNTER — TELEPHONE (OUTPATIENT)
Dept: FAMILY MEDICINE CLINIC | Facility: CLINIC | Age: 35
End: 2025-04-28

## 2025-04-28 RX ORDER — BUSPIRONE HYDROCHLORIDE 10 MG/1
10 TABLET ORAL 2 TIMES DAILY
Qty: 180 TABLET | Refills: 1 | OUTPATIENT
Start: 2025-04-28

## 2025-04-28 NOTE — TELEPHONE ENCOUNTER
Caller: [facility] - [place], [place] - [address] - [phone]  - [phone]    Relationship to patient: Pharmacy    Best call back number: [phone]    Patient is needing: THEY RECEIVED THE     busPIRone (BUSPAR) 10 MG tablet   PRESCRIPTION, BUT HAVE QUESTIONS ON THE QUANTITY AND HOW OFTEN TO TAKE IT. PLEASE REACH OUT.

## 2025-05-15 ENCOUNTER — OFFICE VISIT (OUTPATIENT)
Dept: PSYCHIATRY | Facility: CLINIC | Age: 35
End: 2025-05-15
Payer: MEDICAID

## 2025-05-15 VITALS
BODY MASS INDEX: 29.23 KG/M2 | HEIGHT: 63 IN | HEART RATE: 77 BPM | OXYGEN SATURATION: 100 % | WEIGHT: 165 LBS | DIASTOLIC BLOOD PRESSURE: 64 MMHG | SYSTOLIC BLOOD PRESSURE: 106 MMHG

## 2025-05-15 DIAGNOSIS — R46.89 OPPOSITIONAL DEFIANT BEHAVIOR: ICD-10-CM

## 2025-05-15 DIAGNOSIS — F70 MILD INTELLECTUAL DISABILITY: ICD-10-CM

## 2025-05-15 DIAGNOSIS — F43.10 PTSD (POST-TRAUMATIC STRESS DISORDER): ICD-10-CM

## 2025-05-15 DIAGNOSIS — R46.89 AGGRESSION: Primary | ICD-10-CM

## 2025-05-15 DIAGNOSIS — F39 MOOD DISORDER: ICD-10-CM

## 2025-05-15 DIAGNOSIS — F41.9 ANXIETY: ICD-10-CM

## 2025-05-15 DIAGNOSIS — F51.04 INSOMNIA, PSYCHOPHYSIOLOGICAL: ICD-10-CM

## 2025-05-15 RX ORDER — BUSPIRONE HYDROCHLORIDE 10 MG/1
10 TABLET ORAL 2 TIMES DAILY
Qty: 60 TABLET | Refills: 5 | Status: SHIPPED | OUTPATIENT
Start: 2025-05-15

## 2025-05-15 RX ORDER — RISPERIDONE 0.5 MG/1
0.5 TABLET ORAL EVERY 12 HOURS SCHEDULED
Qty: 60 TABLET | Refills: 1 | Status: SHIPPED | OUTPATIENT
Start: 2025-05-15

## 2025-05-15 RX ORDER — SERTRALINE HYDROCHLORIDE 100 MG/1
100 TABLET, FILM COATED ORAL DAILY
Qty: 90 TABLET | Refills: 3 | Status: SHIPPED | OUTPATIENT
Start: 2025-05-15
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
## 2025-06-03 DIAGNOSIS — F51.04 INSOMNIA, PSYCHOPHYSIOLOGICAL: ICD-10-CM

## 2025-06-03 NOTE — TELEPHONE ENCOUNTER
REFILL REQUEST:    melatonin 5 MG sublingual tablet sublingual tablet (03/25/2025)     F/UP: 07/14/2025.  LOV: 05/15/2025.

## 2025-07-14 ENCOUNTER — TELEPHONE (OUTPATIENT)
Dept: PSYCHIATRY | Facility: CLINIC | Age: 35
End: 2025-07-14

## 2025-07-14 NOTE — TELEPHONE ENCOUNTER
[facility] NEEDS UPDATED MED LIST AND REFILLS     NO [facility] ON FILE     [facility] TO FAX OVER [facility]

## 2025-07-14 NOTE — TELEPHONE ENCOUNTER
ATTEMPTED TO CONTACT [facility]     NO ANSWER      LEFT VOICEMAIL WITH INSTRUCTIONS TO RETURN CALL TO OFFICE AT [phone]

## 2025-07-15 NOTE — TELEPHONE ENCOUNTER
UNABLE TO CONTACT [facility] AT THE NUMBER IN THE CHART     [facility]

## 2025-07-16 ENCOUNTER — OFFICE VISIT (OUTPATIENT)
Dept: PSYCHIATRY | Facility: CLINIC | Age: 35
End: 2025-07-16
Payer: MEDICAID

## 2025-07-16 VITALS
HEART RATE: 89 BPM | BODY MASS INDEX: 29.77 KG/M2 | DIASTOLIC BLOOD PRESSURE: 79 MMHG | SYSTOLIC BLOOD PRESSURE: 127 MMHG | HEIGHT: 63 IN | WEIGHT: 168 LBS

## 2025-07-16 DIAGNOSIS — F70 MILD INTELLECTUAL DISABILITY: ICD-10-CM

## 2025-07-16 DIAGNOSIS — F43.10 PTSD (POST-TRAUMATIC STRESS DISORDER): ICD-10-CM

## 2025-07-16 DIAGNOSIS — F39 MOOD DISORDER: Primary | ICD-10-CM

## 2025-07-16 DIAGNOSIS — F41.9 ANXIETY: ICD-10-CM

## 2025-07-16 DIAGNOSIS — R46.89 OPPOSITIONAL DEFIANT BEHAVIOR: ICD-10-CM

## 2025-07-16 DIAGNOSIS — R46.89 AGGRESSION: ICD-10-CM

## 2025-07-16 RX ORDER — LORAZEPAM 0.5 MG/1
0.5 TABLET ORAL EVERY 8 HOURS PRN
Qty: 90 TABLET | Refills: 0 | Status: SHIPPED | OUTPATIENT
Start: 2025-07-16 | End: 2025-07-16

## 2025-07-16 RX ORDER — LORAZEPAM 0.5 MG/1
0.5 TABLET ORAL EVERY 8 HOURS PRN
Qty: 90 TABLET | Refills: 0 | Status: SHIPPED | OUTPATIENT
Start: 2025-07-16 | End: 2025-08-15

## 2025-07-16 RX ORDER — RISPERIDONE 0.5 MG/1
0.5 TABLET ORAL EVERY 12 HOURS SCHEDULED
Qty: 60 TABLET | Refills: 3 | Status: SHIPPED | OUTPATIENT
Start: 2025-07-16

## 2025-07-16 NOTE — TELEPHONE ENCOUNTER
SPOKE WITH SONNY WISEMAN IN OFFICE     PT(PATIENT) HAS MOVED TO [place] SONNY ENGLE TO FAX OVER [facility] FOR [facility]

## 2025-07-16 NOTE — PROGRESS NOTES
"[facility]  Follow-up Visit    Chief Complaint: \"he is here for a psychiatric evaluation\" \"I wanted to change and move away from [place]\"      History of Present Illness: Rakesh Mitchell is a smiling 34 y.o. male who presents today for initial evaluation regarding  . Rakesh presents accompanied with Grace Estrella (family home provider) in no acute distress and engages with me appropriately. Psychotropic regimen with which patient presents is described as lorazepam 0.5 mg po q 8 hours as needed for anxiety. Trazodone 100 mg po q HS, and sertraline 100 mg po q day, and buspirone 10 mg po BID. He uses CBD oil and Boy's stress gummies that patient pays out of pocket for and wishes to stop both.      Rakesh alternating symptoms of elevated (irritability and agitation) and depressed moods that do not conform to temporal parameters of bipolar disorder and are not sufficiently encapsulated by MDD. Grace shares that Rakesh becomes more introverted and withdrawn following periods of agitation.       History is positive for signs/symptoms suggestive of panic disorder-recurrent episodes of intense, unprovoked anxiety with chest pain, feelings of doom, dizziness, paresthesias, SOB, and associated avoidance 2/2 fear of recurrence of panic reports overstimulation with noises, changes in schedule, and interruptions within his day. Patient endorses that he picks his arms.      Recent and past history of significant trauma for which there are related intrusion symptoms related to the traumatic event (APS and \"re homing\" of patient due to questionable care by previous in home caregiver, also was homeless for a time prior) distressing memories, flashbacks, nightmares, intense distress associated with triggering stimuli, marked physiological reactions to triggering stimuli), persistent avoidance of triggering stimuli, negative alterations in cognition and mood (memory lapses, negative schemas, " distorted cognitions about the event, social withdrawal, feelings of detachment/estrangement, persistent anhedonia), and marked alterations in arousal and reactivity (irritability, reckless behavior, hypervigilance, exaggerated startle, sleep disturbances). Grace voices that his distress tolerance has been lowered and she notices that he is on edge.      Psychiatric screening is negative for pathognomonic history of: TBI, psychosis, violence, and suicidality.     Increase trazodone 100 mg po q HS to 200 mg po for week, then increase 300 mg po q HS. Begin risperidone 0.5 mg po BID. I will prescribe risperidone  for behaviors/aggression. Patient has been advised that this agent has propensity to contribute to EPS (particularly dystonia, tremor, akathisia, and TD), weight gain, dyslipidemia, hyperglycemia, reduced arousal, and somnolence. Additionally, risks regarding DRESS, NMS, and diminished seizure threshold have been reviewed today. I have counseled the patient with regard to diagnoses and the recommended treatment regimen as documented below: I will assume prescriptive responsibility for lorazapam 0.5 mg po q 8 hours as needed for anxiety-will work to discontinue, continue sertraline 100 mg po q day, and buspirone 10 mg po BID. Will look for ways to minimize/simplify medication regime and mitigate risks associated with psychiatric polypharmacy. Patient acknowledges the diagnoses per my rendered interpretation. Patient demonstrates awareness/understanding of viable alternatives for treatment as well as potential risks, benefits, and side effects associated with this regimen and is amenable to proceed in this fashion.      Recommended lifestyle changes: consider joining special [place] for group activities  Psychiatric History:  Diagnoses: mood disorder, mild intellectual disability, ASD?  Outpatient history: [facility]  Inpatient history: none  Medication trials: sertraline,   Other treatment modalities: no  Self  "harm: skin picking  Suicide attempts: No  Abuse or neglect: questionable with past home care provider     Substance Abuse History:   Types/methods/frequency: *none  Transtheoretical stage: none     Social History:  Residence: lives with [facility]  Vocation: no  Source of income: Social Security  Last grade completed: special education  Pertinent developmental history: IEP during school  Pertinent legal history: No history   Hobbies/interests: not disclosed  Scientology: N/A  Exercise:walking  Dietary habits: no pertinent issues   Sleep hygiene: uses trazodone for sleep, problematic at times due to anxiety  Social habits: no pertinent issues   Sunlight: There are no concern for under-exposure.  Caffeine intake: no pertinent issues   Hydration habits: no pertinent issues    history: No       Interval History Rakesh is a 34 y.o. male who presents today for follow-up. Rakesh presents accompanied with Krista, in no acute distress and engages with me appropriately.   Current treatment regimen includes:   -Buspirone 10 mg p.o. twice daily  Sertraline 100 mg p.o. daily  Risperidone 0.5 mg p.o. twice daily  Melatonin 5 mg sublingual p.o. nightly   (Keppra 1000 mg SL BID-Oropilla)  (Lorazepam 0.5 mg as needed every 8 hours (previous script from another provider)  Side-effects per given history: None reported.      Today the patient feels \"good.\" He has moved from [facility] to [facility] and residing with 2 other gentlemen. Working on bathroom and toileting so he has less accidents. He has had reports of outburst at [employer] and one bout of self-induced vomiting. No behaviors reports at home. He has been happy in his new home. He has recently gone to see [place], [place], and is going to [place] to [facility] in December.    Thought process and content are devoid of overt aberration suggestive of acute caty/psychosis. The patient denies SI/HI/AVH. There are changes on exam today " compared to most recent evaluation.    - sleep: no concerns, depends 10 pm to 6 am  - appetite: moderately controlled    We will continue current regimen.  Patient's assistant Esmer asked about switching to a provider in [place].  Discussed [place] medication prescriber provider options.  Esmer to discuss with Rich and they will let me know if they requested a change in provider due to location.  We will plan follow up in 3 months.  I encouraged both the patient and Esmer to reach out if there are any issues or concerns.  Patient and Esmer verbalized understanding  I have counseled the patient with regard to diagnoses and the recommended treatment regimen as documented below. Patient acknowledges the diagnoses per my rendered interpretation. Patient demonstrates understanding of potential risks/benefits/side effects associated with this regimen and is amenable to proceed in this fashion.       Social History     Socioeconomic History    Marital status: Single   Tobacco Use    Smoking status: Never     Passive exposure: Never    Smokeless tobacco: Never   Vaping Use    Vaping status: Never Used   Substance and Sexual Activity    Alcohol use: Never    Drug use: Never    Sexual activity: Defer       Tobacco use counseling/intervention: patient does not use tobacco.   Problem List:  Patient Active Problem List   Diagnosis    Seasonal allergies    Ceruminosis, bilateral    Dyspepsia    Murmur, heart    Mental impairment    Agitation    Constipation    Anxiety    Chronic superficial gastritis without bleeding    Functional vomiting    Generalized tonic clonic epilepsy     Allergy:   No Known Allergies     Discontinued Medications:  Medications Discontinued During This Encounter   Medication Reason    LORazepam (ATIVAN) 0.5 MG tablet Reorder    risperiDONE (risperDAL) 0.5 MG tablet Reorder    LORazepam (ATIVAN) 0.5 MG tablet *Error       Current Medications:   Current Outpatient Medications   Medication Sig Dispense  Refill    busPIRone (BUSPAR) 10 MG tablet Take 1 tablet by mouth 2 (Two) Times a Day. 60 tablet 5    carbamide peroxide (Debrox) 6.5 % otic solution Administer 5 drops into both ears As Needed for Ear Pain (ear wax impaction). 15 mL 1    docusate sodium (COLACE) 100 MG capsule Take 1 capsule by mouth Every Night. 90 capsule 3    famotidine (Pepcid) 20 MG tablet Take 1 tablet by mouth every night at bedtime. 90 tablet 3    fluticasone (FLONASE) 50 MCG/ACT nasal spray       hydrOXYzine (ATARAX) 50 MG tablet Take 1 tablet by mouth Every 6 (Six) Hours As Needed.      ibuprofen (Advil) 200 MG tablet Take 1 tablet by mouth Every 6 (Six) Hours As Needed for Mild Pain, Headache or Fever. 120 tablet 5    levETIRAcetam (KEPPRA) 100 MG/ML solution Take 1000 mg BID. 1 each 6    melatonin 5 MG sublingual tablet sublingual tablet PLACE 1 (ONE) TABLET UNDER THE TONGUE EVERY NIGHT 30 each 2    montelukast (SINGULAIR) 10 MG tablet Take 1 tablet by mouth Every Night. 90 tablet 3    multivitamin with minerals tablet tablet Boy Stress Gummies - take 2 gummies daily as directed on bottle 180 each 3    ondansetron (ZOFRAN) 4 MG tablet Take 1 tablet by mouth Every 8 (Eight) Hours As Needed for Nausea or Vomiting. 60 tablet 5    polyethylene glycol (ClearLax) 17 GM/SCOOP powder Give 1 dose (17 g) every EVERY 48 HOURS AS NEEDED IF NO BOWEL MOVEMENT. 510 g 5    psyllium (Metamucil Smooth Texture) 58.6 % powder Take  by mouth Take As Directed. Take 2 teaspoons daily. 660 g 12    risperiDONE (risperDAL) 0.5 MG tablet Take 1 tablet by mouth Every 12 (Twelve) Hours. 60 tablet 3    sertraline (ZOLOFT) 100 MG tablet Take 1 tablet by mouth Daily. 90 tablet 3    Vitamins A & D (vitamin A & D) ointment Apply 1 application topically to the appropriate area as directed As Needed (skin irritation). 113 g 11    LORazepam (ATIVAN) 0.5 MG tablet Take 1 tablet by mouth Every 8 (Eight) Hours As Needed for Anxiety (for anxiety, and vomiting) for up to 30  "days. 90 tablet 0     No current facility-administered medications for this visit.     Past Medical History:  Past Medical History:   Diagnosis Date    Allergic     Anxiety     Incontinent of feces     Low back pain     Mental impairment     Nausea and vomiting 05/31/2023    Tethered cord      Past Surgical History:  Past Surgical History:   Procedure Laterality Date    BACK SURGERY      COLONOSCOPY  2003    COLONOSCOPY N/A 07/05/2023    Procedure: COLONOSCOPY TO TRANSVERSE WITH DISIMPACTION;  Surgeon: Rusty Baker MD;  Location: [facility] ENDOSCOPY;  Service: Gastroenterology;  Laterality: N/A;  FECAL IMPACTION, CONSTIPATION    ENDOSCOPY N/A 8/18/2023    Procedure: ESOPHAGOGASTRODUODENOSCOPY with biopsies;  Surgeon: Rusty Baker MD;  Location: [facility] ENDOSCOPY;  Service: Gastroenterology;  Laterality: N/A;  pre- nausea & vomiting  post- mild esopohagitis, mild gastritits       MENTAL STATUS EXAM   General Appearance:  Cleanly groomed and dressed and well developed  Eye Contact:  Good eye contact  Attitude:  Cooperative, polite and candid  Motor Activity:  Normal gait, posture  Muscle Strength:  Normal  Speech:  Normal rate, tone, volume  Language:  Spontaneous  Mood and affect:  Normal, pleasant and happy  Thought Process:  Logical and goal-directed  Associations/ Thought Content:  No delusions  Hallucinations:  None  Suicidal Ideations:  Not present  Homicidal Ideation:  Not present  Sensorium:  Alert and clear  Orientation:  Person, place, time and situation  Immediate Recall, Recent, and Remote Memory:  Intact  Attention Span/ Concentration:  Good  Fund of Knowledge:  Appropriate for age and educational level  Intellectual Functioning:  Average range  Insight:  Limited  Judgement:  Limited  Reliability:  Fair  Impulse Control:  Fair      Vital Signs:   /79   Pulse 89   Ht 160 cm (62.99\")   Wt 76.2 kg (168 lb)   BMI 29.77 kg/m²    Lab Results:   No visits with results within 6 Month(s) " from this visit.   Latest known visit with results is:   Lab on 11/08/2024   Component Date Value Ref Range Status    Glucose 11/08/2024 131 (H)  65 - 99 mg/dL Final    BUN 11/08/2024 10  6 - 20 mg/dL Final    Creatinine 11/08/2024 0.91  0.76 - 1.27 mg/dL Final    Sodium 11/08/2024 136  136 - 145 mmol/L Final    Potassium 11/08/2024 3.8  3.5 - 5.2 mmol/L Final    Chloride 11/08/2024 101  98 - 107 mmol/L Final    CO2 11/08/2024 21.0 (L)  22.0 - 29.0 mmol/L Final    Calcium 11/08/2024 9.0  8.6 - 10.5 mg/dL Final    BUN/Creatinine Ratio 11/08/2024 11.0  7.0 - 25.0 Final    Anion Gap 11/08/2024 14.0  5.0 - 15.0 mmol/L Final    eGFR 11/08/2024 114.1  >60.0 mL/min/1.73 Final    TSH 11/08/2024 1.050  0.270 - 4.200 uIU/mL Final    T Uptake 11/08/2024 1.09  0.80 - 1.30 TBI Final    T4, Total 11/08/2024 5.11  4.50 - 11.70 mcg/dL Final    T4 results may be falsely increased if patient taking Biotin.    WBC 11/08/2024 5.08  3.40 - 10.80 10*3/mm3 Final    RBC 11/08/2024 4.71  4.14 - 5.80 10*6/mm3 Final    Hemoglobin 11/08/2024 14.5  13.0 - 17.7 g/dL Final    Hematocrit 11/08/2024 42.8  37.5 - 51.0 % Final    MCV 11/08/2024 90.9  79.0 - 97.0 fL Final    MCH 11/08/2024 30.8  26.6 - 33.0 pg Final    MCHC 11/08/2024 33.9  31.5 - 35.7 g/dL Final    RDW 11/08/2024 12.0 (L)  12.3 - 15.4 % Final    RDW-SD 11/08/2024 39.7  37.0 - 54.0 fl Final    MPV 11/08/2024 9.3  6.0 - 12.0 fL Final    Platelets 11/08/2024 238  140 - 450 10*3/mm3 Final    Neutrophil % 11/08/2024 62.2  42.7 - 76.0 % Final    Lymphocyte % 11/08/2024 25.8  19.6 - 45.3 % Final    Monocyte % 11/08/2024 9.4  5.0 - 12.0 % Final    Eosinophil % 11/08/2024 1.4  0.3 - 6.2 % Final    Basophil % 11/08/2024 0.6  0.0 - 1.5 % Final    Immature Grans % 11/08/2024 0.6 (H)  0.0 - 0.5 % Final    Neutrophils, Absolute 11/08/2024 3.16  1.70 - 7.00 10*3/mm3 Final    Lymphocytes, Absolute 11/08/2024 1.31  0.70 - 3.10 10*3/mm3 Final    Monocytes, Absolute 11/08/2024 0.48  0.10 - 0.90  10*3/mm3 Final    Eosinophils, Absolute 11/08/2024 0.07  0.00 - 0.40 10*3/mm3 Final    Basophils, Absolute 11/08/2024 0.03  0.00 - 0.20 10*3/mm3 Final    Immature Grans, Absolute 11/08/2024 0.03  0.00 - 0.05 10*3/mm3 Final    nRBC 11/08/2024 0.0  0.0 - 0.2 /100 WBC Final       ASSESSMENT AND PLAN:    ICD-10-CM ICD-9-CM   1. Mood disorder  F39 296.90   2. Aggression  R46.89 V40.39   3. Mild intellectual disability  F70 317   4. Oppositional defiant behavior  R46.89 V40.39   5. PTSD (post-traumatic stress disorder)  F43.10 309.81   6. Anxiety  F41.9 300.00       Rakesh is a 34 y.o. male who presents today for follow-up regarding medication resposne and recent life changes. We have discussed the interval history and the treatment plan below, including potential R/B/SE of the recommended regimen of which the patient demonstrates understanding. Patient is agreeable to call 911 or go to the nearest ER should he become concerned for his own safety and/or the safety of those around him. There are are no overt indices of acute caty/psychosis on exam today. VALE reviewed and is as expected.    Medication regimen:   Continue Buspirone 10 mg p.o. twice daily  Continue Sertraline 100 mg p.o. daily  Continue Risperidone 0.5 mg p.o. twice daily  Continue Melatonin 5 mg sublingual p.o. nightly  Continue Lorazepam 0.5 mg po BID; patient is advised not to misuse prescribed medications or to use them with any exogenous substances that aren't disclosed to this provider as they may interact with the regimen to the patient's detriment.   Risk Assessment: protracted risk is moderate, imminent risk is moderate. Do note that this is subject to change with the Restoration of new stressors, treatment non-adherence, use of substances, and/or new medical ails.   Monitoring: reviewed labs/imaging as populated above; ordered  Therapy: through [facility]  Follow-up: 3 months   Communications: N/A    TREATMENT PLAN/GOALS: challenge patterns of living  conducive to symptom burden, implement recommended regimen as above with augmentative, intermittent supportive psychotherapy to reduce symptom burden. Patient acknowledged and verbally consented to continue treatment. The importance of adherence to the recommended treatment and interval follow-up appointments was again emphasized today: patient has good treatment adherence per given history. Patient was today reminded to limit daily caffeine intake, hydrate appropriately, eat healthy and nutritious foods, engage sleep hygiene measures, engage appropriate exposure to sunlight, engage with hobbies in balance with life necessities, and exercise appropriate to their capacity to do so.       Parts of this note are electronic transcriptions/translations of spoken language to printed text using the Dragon Dictation system.    Electronically signed by JP Cadet, 07/16/25

## 2025-07-23 ENCOUNTER — TELEPHONE (OUTPATIENT)
Dept: PSYCHIATRY | Facility: CLINIC | Age: 35
End: 2025-07-23
Payer: MEDICAID

## 2025-07-23 NOTE — TELEPHONE ENCOUNTER
I ATTEMPTED TO CONTACT SONG LAU -[phone] (NUMBER THAT IS ON PTS VERBAL RELEASE)    NO ANSWER.    VOICEMAIL MESSAGE LEFT REQUESTING THAT SHE CALL OUR OFFICE BACK.

## 2025-07-23 NOTE — TELEPHONE ENCOUNTER
ROSALVA TRUJILLO A NURSE FROM [facility] THAT HANDLES PTS MEDICATION CALLED IN.    WE HAVE AN KUSUM TO BE ABLE TO SPEAK WITH GLENDY AND SELENE BUT NOT THE NURSE DIRECTLY.    SHE HAS MEDICATION QUESTIONS THOUGH.    SHE STATES THAT ON THE MED CONSULT IT STATES FOR PT TO TAKE MELATONIN 10 MG; BUT WHEN THE RX CAME THRU IT STATES 5 MG 1 TABLET.   NURSE STATES THAT PT HAS BEEN GETTING 5 MG NIGHTLY.    WORK: [phone]  CELL PHONE: [phone].    SHE DOES GET BAD SERVICE AND STATES IT IS A SECURE LINE SO SHE AUTHORIZE US TO LEAVE A DETAILED VOICEMAIL IS SHE DOESN'T ANSWER.    HOW WOULD PROVIDER LIKE TO PROCEED AS WE DON'T HAVE HER ON AN KUSUM.

## 2025-07-25 NOTE — TELEPHONE ENCOUNTER
An address change was made and caregiver made that change, If an KUSUM is needed for [facility] then Caregiver will have to have to fill out a KUSUM for [facility]. A new  would need to be added to the Verbal release

## 2025-07-25 NOTE — TELEPHONE ENCOUNTER
I CALLED AND SPOKE WITH SONG.    SHE STATES THAT AT PTS LAST APPT SHE DROPPED OFF A NEW KUSUM THAT HAS [facility] ON IT.    SHE DOES GIVE US PERMISSION TO SPEAK WITH THEM THOUGH.     SHE REPORTS THAT PT IS NOW UNDER [facility] STAFF RESIDENCE.    FOR CONSENT AND MED CHANGES WE NEED TO STILL CONTACT SONG.    PTS NEW CAREGIVER/  IS NOW JAYE GAMBOA -[phone].     WE ARE TO CONTACT HER FOR SCHEDULING APPOINTMENTS.     I WILL FOLLOW UP WITH  STAFF IN REGARDS TO KUSUM THAT HAS [facility] ON IT.

## 2025-07-28 NOTE — TELEPHONE ENCOUNTER
Called Guardian and explained to her that we need an KUSUM to be able to speak with the Residential staff or nursing for [facility].  Brittney is to come in and fill out the new KUSUM for [facility] residential services.  States that she will most likely come by tomorrow after lunch

## 2025-08-04 ENCOUNTER — TELEPHONE (OUTPATIENT)
Dept: FAMILY MEDICINE CLINIC | Facility: CLINIC | Age: 35
End: 2025-08-04
Payer: MEDICAID

## 2025-08-13 ENCOUNTER — TELEPHONE (OUTPATIENT)
Dept: PSYCHIATRY | Facility: CLINIC | Age: 35
End: 2025-08-13
Payer: MEDICAID

## 2025-08-26 ENCOUNTER — OFFICE VISIT (OUTPATIENT)
Dept: PSYCHIATRY | Facility: CLINIC | Age: 35
End: 2025-08-26
Payer: MEDICAID

## 2025-08-26 VITALS
DIASTOLIC BLOOD PRESSURE: 78 MMHG | HEART RATE: 97 BPM | SYSTOLIC BLOOD PRESSURE: 127 MMHG | WEIGHT: 174.8 LBS | OXYGEN SATURATION: 94 % | BODY MASS INDEX: 30.97 KG/M2 | HEIGHT: 63 IN

## 2025-08-26 DIAGNOSIS — F43.10 PTSD (POST-TRAUMATIC STRESS DISORDER): ICD-10-CM

## 2025-08-26 DIAGNOSIS — F39 MOOD DISORDER: ICD-10-CM

## 2025-08-26 DIAGNOSIS — R46.89 AGGRESSION: Primary | ICD-10-CM

## 2025-08-26 DIAGNOSIS — F41.9 ANXIETY: ICD-10-CM

## 2025-08-26 DIAGNOSIS — F70 MILD INTELLECTUAL DISABILITY: ICD-10-CM

## 2025-08-26 DIAGNOSIS — F51.04 INSOMNIA, PSYCHOPHYSIOLOGICAL: ICD-10-CM

## 2025-08-26 DIAGNOSIS — R46.89 OPPOSITIONAL DEFIANT BEHAVIOR: ICD-10-CM

## 2025-08-26 RX ORDER — LORAZEPAM 0.5 MG/1
0.5 TABLET ORAL 2 TIMES DAILY
Qty: 60 TABLET | Refills: 0 | Status: SHIPPED | OUTPATIENT
Start: 2025-08-26 | End: 2025-08-27 | Stop reason: SDUPTHER

## 2025-08-27 ENCOUNTER — TELEPHONE (OUTPATIENT)
Dept: PSYCHIATRY | Facility: CLINIC | Age: 35
End: 2025-08-27
Payer: MEDICAID

## 2025-08-27 DIAGNOSIS — R46.89 OPPOSITIONAL DEFIANT BEHAVIOR: ICD-10-CM

## 2025-08-27 RX ORDER — LORAZEPAM 0.5 MG/1
0.5 TABLET ORAL 2 TIMES DAILY
Qty: 60 TABLET | Refills: 0 | Status: SHIPPED | OUTPATIENT
Start: 2025-08-27 | End: 2025-09-26

## (undated) DEVICE — ADAPT CLN BIOGUARD AIR/H2O DISP

## (undated) DEVICE — SENSR O2 OXIMAX FNGR A/ 18IN NONSTR

## (undated) DEVICE — FRCP BX RADJAW4 NDL 2.8 240CM LG OG BX40

## (undated) DEVICE — LN SMPL CO2 SHTRM SD STREAM W/M LUER

## (undated) DEVICE — KT ORCA ORCAPOD DISP STRL

## (undated) DEVICE — BITEBLOCK OMNI BLOC

## (undated) DEVICE — TUBING, SUCTION, 1/4" X 10', STRAIGHT: Brand: MEDLINE